# Patient Record
Sex: MALE | Race: WHITE | NOT HISPANIC OR LATINO | Employment: OTHER | URBAN - METROPOLITAN AREA
[De-identification: names, ages, dates, MRNs, and addresses within clinical notes are randomized per-mention and may not be internally consistent; named-entity substitution may affect disease eponyms.]

---

## 2023-03-23 DIAGNOSIS — E53.8 VITAMIN B12 DEFICIENCY: ICD-10-CM

## 2023-03-23 RX ORDER — LANOLIN ALCOHOL/MO/W.PET/CERES
2 CREAM (GRAM) TOPICAL DAILY
COMMUNITY
Start: 2022-12-15 | End: 2023-03-23 | Stop reason: SDUPTHER

## 2023-03-23 RX ORDER — LANOLIN ALCOHOL/MO/W.PET/CERES
2000 CREAM (GRAM) TOPICAL DAILY
Qty: 180 TABLET | Refills: 0 | Status: SHIPPED | OUTPATIENT
Start: 2023-03-23 | End: 2023-06-23

## 2023-04-20 DIAGNOSIS — K21.9 GASTROESOPHAGEAL REFLUX DISEASE WITHOUT ESOPHAGITIS: Primary | ICD-10-CM

## 2023-04-20 RX ORDER — OMEPRAZOLE 40 MG/1
40 CAPSULE, DELAYED RELEASE ORAL 2 TIMES DAILY
COMMUNITY
End: 2023-04-20 | Stop reason: SDUPTHER

## 2023-04-20 RX ORDER — OMEPRAZOLE 40 MG/1
40 CAPSULE, DELAYED RELEASE ORAL 2 TIMES DAILY
Qty: 60 CAPSULE | Refills: 2 | Status: SHIPPED | OUTPATIENT
Start: 2023-04-20 | End: 2023-05-16

## 2023-05-14 DIAGNOSIS — K21.9 GASTROESOPHAGEAL REFLUX DISEASE WITHOUT ESOPHAGITIS: ICD-10-CM

## 2023-05-16 RX ORDER — OMEPRAZOLE 40 MG/1
40 CAPSULE, DELAYED RELEASE ORAL 2 TIMES DAILY
Qty: 60 CAPSULE | Refills: 2 | Status: SHIPPED | OUTPATIENT
Start: 2023-05-16 | End: 2023-08-22 | Stop reason: SDUPTHER

## 2023-06-05 ENCOUNTER — HOSPITAL ENCOUNTER (OUTPATIENT)
Dept: DATA CONVERSION | Facility: HOSPITAL | Age: 73
End: 2023-06-05
Attending: INTERNAL MEDICINE
Payer: MEDICARE

## 2023-06-05 DIAGNOSIS — R94.31 ABNORMAL ELECTROCARDIOGRAM (ECG) (EKG): ICD-10-CM

## 2023-06-05 DIAGNOSIS — I83.90 ASYMPTOMATIC VARICOSE VEINS OF UNSPECIFIED LOWER EXTREMITY: ICD-10-CM

## 2023-06-05 DIAGNOSIS — K50.90 CROHN'S DISEASE, UNSPECIFIED, WITHOUT COMPLICATIONS (MULTI): ICD-10-CM

## 2023-06-05 DIAGNOSIS — E78.5 HYPERLIPIDEMIA, UNSPECIFIED: ICD-10-CM

## 2023-06-05 DIAGNOSIS — Z85.820 PERSONAL HISTORY OF MALIGNANT MELANOMA OF SKIN: ICD-10-CM

## 2023-06-05 DIAGNOSIS — Z86.718 PERSONAL HISTORY OF OTHER VENOUS THROMBOSIS AND EMBOLISM: ICD-10-CM

## 2023-06-05 DIAGNOSIS — K21.9 GASTRO-ESOPHAGEAL REFLUX DISEASE WITHOUT ESOPHAGITIS: ICD-10-CM

## 2023-06-05 DIAGNOSIS — N18.9 CHRONIC KIDNEY DISEASE, UNSPECIFIED: ICD-10-CM

## 2023-06-05 DIAGNOSIS — F17.290 NICOTINE DEPENDENCE, OTHER TOBACCO PRODUCT, UNCOMPLICATED: ICD-10-CM

## 2023-06-05 DIAGNOSIS — Z85.828 PERSONAL HISTORY OF OTHER MALIGNANT NEOPLASM OF SKIN: ICD-10-CM

## 2023-06-05 DIAGNOSIS — K63.5 POLYP OF COLON: ICD-10-CM

## 2023-06-05 DIAGNOSIS — Z85.038 PERSONAL HISTORY OF OTHER MALIGNANT NEOPLASM OF LARGE INTESTINE: ICD-10-CM

## 2023-06-05 DIAGNOSIS — Z86.711 PERSONAL HISTORY OF PULMONARY EMBOLISM: ICD-10-CM

## 2023-06-05 DIAGNOSIS — E21.3 HYPERPARATHYROIDISM, UNSPECIFIED (MULTI): ICD-10-CM

## 2023-06-05 DIAGNOSIS — K50.10 CROHN'S DISEASE OF LARGE INTESTINE WITHOUT COMPLICATIONS (MULTI): ICD-10-CM

## 2023-06-05 DIAGNOSIS — K91.858: ICD-10-CM

## 2023-06-05 DIAGNOSIS — I12.9 HYPERTENSIVE CHRONIC KIDNEY DISEASE WITH STAGE 1 THROUGH STAGE 4 CHRONIC KIDNEY DISEASE, OR UNSPECIFIED CHRONIC KIDNEY DISEASE: ICD-10-CM

## 2023-06-09 LAB
COMPLETE PATHOLOGY REPORT: NORMAL
CONVERTED CLINICAL DIAGNOSIS-HISTORY: NORMAL
CONVERTED FINAL DIAGNOSIS: NORMAL
CONVERTED FINAL REPORT PDF LINK TO COPY AND PASTE: NORMAL
CONVERTED GROSS DESCRIPTION: NORMAL

## 2023-06-22 DIAGNOSIS — E53.8 VITAMIN B12 DEFICIENCY: ICD-10-CM

## 2023-06-23 RX ORDER — ZINC GLUCONATE 50 MG
TABLET ORAL
Qty: 180 TABLET | Refills: 0 | Status: SHIPPED | OUTPATIENT
Start: 2023-06-23 | End: 2024-06-04 | Stop reason: WASHOUT

## 2023-08-03 ENCOUNTER — APPOINTMENT (OUTPATIENT)
Dept: PRIMARY CARE | Facility: CLINIC | Age: 73
End: 2023-08-03
Payer: MEDICARE

## 2023-08-22 DIAGNOSIS — K21.9 GASTROESOPHAGEAL REFLUX DISEASE WITHOUT ESOPHAGITIS: ICD-10-CM

## 2023-08-22 RX ORDER — OMEPRAZOLE 40 MG/1
40 CAPSULE, DELAYED RELEASE ORAL 2 TIMES DAILY
Qty: 180 CAPSULE | Refills: 3 | Status: SHIPPED | OUTPATIENT
Start: 2023-08-22 | End: 2024-02-29 | Stop reason: SDUPTHER

## 2023-08-22 NOTE — TELEPHONE ENCOUNTER
Rx Refill Request Telephone Encounter    Name:  Asaf Wagner  :  [id number]  Medication Name:  omeprazole 40 mg DR capsule  Specific Pharmacy location:    [facility]/pharmacy #[id number] [place], [place] - [address] [place] [id number]  Phone: [phone] Fax: [phone]  Date of last appointment:  2023  Date of next appointment:   None scheduled  Best number to reach patient:

## 2023-10-06 ENCOUNTER — SPECIALTY PHARMACY (OUTPATIENT)
Dept: PHARMACY | Facility: CLINIC | Age: 73
End: 2023-10-06

## 2023-10-06 ENCOUNTER — PHARMACY VISIT (OUTPATIENT)
Dept: PHARMACY | Facility: CLINIC | Age: 73
End: 2023-10-06
Payer: COMMERCIAL

## 2023-10-06 PROCEDURE — RXMED WILLOW AMBULATORY MEDICATION CHARGE

## 2023-10-10 NOTE — PROGRESS NOTES
[facility] Clinical Note    Asaf Wagner is a 73 y.o. male, who is on the specialty pharmacy service for management of: Gastroenterology Core with status of: (Enrolled)     Asaf was contacted on 10/10/2023.    Refer to the encounter summary report for documentation details about patient counseling and education.      Medication Adherence  The importance of adherence was discussed with the patient and they were advised to take the medication as prescribed by their provider. Asaf was encouraged to call his physician's office if they have a question regarding a missed dose.        Patient advised to contact the pharmacy if there are any changes to her medication list, including prescriptions, OTC medications, herbal products, or supplements. Patient was advised of [facility]’s dispensing process, refill timeline, contact information ([phone]), and patient management follow up. Patient confirmed understanding of education conducted during assessment. All patient questions and concerns were addressed to the best of my ability. Patient was encouraged to contact the specialty pharmacy with any questions or concerns.    Confirmed follow-up outreaches are properly scheduled. Reviewed goals of therapy in the program targets.    Nir Dennison, CarlosD

## 2023-11-03 ENCOUNTER — PHARMACY VISIT (OUTPATIENT)
Dept: PHARMACY | Facility: CLINIC | Age: 73
End: 2023-11-03
Payer: COMMERCIAL

## 2023-11-03 ENCOUNTER — SPECIALTY PHARMACY (OUTPATIENT)
Dept: PHARMACY | Facility: CLINIC | Age: 73
End: 2023-11-03

## 2023-11-03 PROCEDURE — RXMED WILLOW AMBULATORY MEDICATION CHARGE

## 2023-11-30 ENCOUNTER — SPECIALTY PHARMACY (OUTPATIENT)
Dept: PHARMACY | Facility: CLINIC | Age: 73
End: 2023-11-30

## 2023-11-30 ENCOUNTER — PHARMACY VISIT (OUTPATIENT)
Dept: PHARMACY | Facility: CLINIC | Age: 73
End: 2023-11-30
Payer: COMMERCIAL

## 2023-11-30 PROCEDURE — RXMED WILLOW AMBULATORY MEDICATION CHARGE

## 2023-12-22 ENCOUNTER — SPECIALTY PHARMACY (OUTPATIENT)
Dept: PHARMACY | Facility: CLINIC | Age: 73
End: 2023-12-22

## 2023-12-29 PROCEDURE — RXMED WILLOW AMBULATORY MEDICATION CHARGE

## 2024-01-02 ENCOUNTER — PHARMACY VISIT (OUTPATIENT)
Dept: PHARMACY | Facility: CLINIC | Age: 74
End: 2024-01-02
Payer: COMMERCIAL

## 2024-01-24 ENCOUNTER — SPECIALTY PHARMACY (OUTPATIENT)
Dept: PHARMACY | Facility: CLINIC | Age: 74
End: 2024-01-24

## 2024-01-24 PROCEDURE — RXMED WILLOW AMBULATORY MEDICATION CHARGE

## 2024-01-25 ENCOUNTER — PHARMACY VISIT (OUTPATIENT)
Dept: PHARMACY | Facility: CLINIC | Age: 74
End: 2024-01-25
Payer: COMMERCIAL

## 2024-01-30 ENCOUNTER — OFFICE VISIT (OUTPATIENT)
Dept: PRIMARY CARE | Facility: CLINIC | Age: 74
End: 2024-01-30
Payer: MEDICARE

## 2024-01-30 VITALS
DIASTOLIC BLOOD PRESSURE: 77 MMHG | HEART RATE: 101 BPM | SYSTOLIC BLOOD PRESSURE: 162 MMHG | WEIGHT: 194.1 LBS | HEIGHT: 68 IN | BODY MASS INDEX: 29.42 KG/M2

## 2024-01-30 DIAGNOSIS — I10 PRIMARY HYPERTENSION: ICD-10-CM

## 2024-01-30 DIAGNOSIS — E55.9 VITAMIN D INSUFFICIENCY: ICD-10-CM

## 2024-01-30 DIAGNOSIS — Z00.00 HEALTHCARE MAINTENANCE: Primary | ICD-10-CM

## 2024-01-30 DIAGNOSIS — R12 HEARTBURN: ICD-10-CM

## 2024-01-30 PROBLEM — U07.1 DISEASE DUE TO SEVERE ACUTE RESPIRATORY SYNDROME CORONAVIRUS 2 (SARS-COV-2): Status: ACTIVE | Noted: 2024-01-30

## 2024-01-30 PROBLEM — I26.99 PULMONARY EMBOLISM (MULTI): Status: ACTIVE | Noted: 2024-01-30

## 2024-01-30 PROBLEM — K91.30 SMALL BOWEL ANASTOMOTIC STRICTURE: Status: ACTIVE | Noted: 2024-01-30

## 2024-01-30 PROBLEM — T50.905A DRUG-INDUCED HEPATITIS: Status: ACTIVE | Noted: 2024-01-30

## 2024-01-30 PROBLEM — E04.1 THYROID NODULE: Status: ACTIVE | Noted: 2024-01-30

## 2024-01-30 PROBLEM — K71.6 DRUG-INDUCED HEPATITIS: Status: ACTIVE | Noted: 2024-01-30

## 2024-01-30 PROBLEM — D18.03 HEMANGIOMA OF LIVER: Status: ACTIVE | Noted: 2024-01-30

## 2024-01-30 PROBLEM — M81.0 OSTEOPOROSIS: Status: ACTIVE | Noted: 2024-01-30

## 2024-01-30 PROBLEM — N28.1 BILATERAL RENAL CYSTS: Status: ACTIVE | Noted: 2024-01-30

## 2024-01-30 PROBLEM — K20.90 ESOPHAGITIS: Status: ACTIVE | Noted: 2024-01-30

## 2024-01-30 PROBLEM — R13.10 DYSPHAGIA: Status: ACTIVE | Noted: 2024-01-30

## 2024-01-30 PROBLEM — D64.9 ANEMIA: Status: ACTIVE | Noted: 2024-01-30

## 2024-01-30 PROBLEM — E78.5 DYSLIPIDEMIA: Status: ACTIVE | Noted: 2024-01-30

## 2024-01-30 PROBLEM — K91.89 SMALL BOWEL ANASTOMOTIC STRICTURE: Status: ACTIVE | Noted: 2024-01-30

## 2024-01-30 PROBLEM — R94.31 ABNORMAL EKG: Status: ACTIVE | Noted: 2024-01-30

## 2024-01-30 PROBLEM — Z90.49 HISTORY OF RIGHT HEMICOLECTOMY: Status: ACTIVE | Noted: 2024-01-30

## 2024-01-30 PROBLEM — K50.80 CROHN'S DISEASE OF BOTH SMALL AND LARGE INTESTINE WITHOUT COMPLICATION (MULTI): Status: ACTIVE | Noted: 2024-01-30

## 2024-01-30 PROBLEM — K80.20 GALL STONES: Status: ACTIVE | Noted: 2024-01-30

## 2024-01-30 PROBLEM — R19.03 ABDOMINAL MASS, RLQ (RIGHT LOWER QUADRANT): Status: ACTIVE | Noted: 2024-01-30

## 2024-01-30 PROBLEM — K50.90 CROHN'S DISEASE (MULTI): Status: ACTIVE | Noted: 2024-01-30

## 2024-01-30 PROBLEM — C43.9 MELANOMA (MULTI): Status: ACTIVE | Noted: 2024-01-30

## 2024-01-30 PROBLEM — E53.8 VITAMIN B12 DEFICIENCY: Status: ACTIVE | Noted: 2024-01-30

## 2024-01-30 PROBLEM — N18.9 CHRONIC KIDNEY DISEASE: Status: ACTIVE | Noted: 2024-01-30

## 2024-01-30 PROBLEM — N25.81 HYPERPARATHYROIDISM, SECONDARY (MULTI): Status: ACTIVE | Noted: 2024-01-30

## 2024-01-30 PROBLEM — K90.89 BILE SALT-INDUCED DIARRHEA (HHS-HCC): Status: ACTIVE | Noted: 2024-01-30

## 2024-01-30 PROBLEM — I87.2 VENOUS INSUFFICIENCY: Status: ACTIVE | Noted: 2024-01-30

## 2024-01-30 PROCEDURE — 1160F RVW MEDS BY RX/DR IN RCRD: CPT | Performed by: INTERNAL MEDICINE

## 2024-01-30 PROCEDURE — 3077F SYST BP >= 140 MM HG: CPT | Performed by: INTERNAL MEDICINE

## 2024-01-30 PROCEDURE — 1126F AMNT PAIN NOTED NONE PRSNT: CPT | Performed by: INTERNAL MEDICINE

## 2024-01-30 PROCEDURE — 3078F DIAST BP <80 MM HG: CPT | Performed by: INTERNAL MEDICINE

## 2024-01-30 PROCEDURE — 99213 OFFICE O/P EST LOW 20 MIN: CPT | Performed by: INTERNAL MEDICINE

## 2024-01-30 PROCEDURE — 1159F MED LIST DOCD IN RCRD: CPT | Performed by: INTERNAL MEDICINE

## 2024-01-30 RX ORDER — SIMETHICONE 125 MG
125 CAPSULE ORAL EVERY 6 HOURS PRN
Qty: 28 CAPSULE | Refills: 0 | Status: SHIPPED | OUTPATIENT
Start: 2024-01-30

## 2024-01-30 RX ORDER — SODIUM PICOSULFATE, MAGNESIUM OXIDE, AND ANHYDROUS CITRIC ACID 10; 3.5; 12 MG/160ML; G/160ML; G/160ML
LIQUID ORAL
COMMUNITY
Start: 2023-04-17 | End: 2024-06-11 | Stop reason: ALTCHOICE

## 2024-01-30 RX ORDER — CHOLESTYRAMINE 4 G/9G
POWDER, FOR SUSPENSION ORAL
COMMUNITY
Start: 2016-05-05

## 2024-01-30 RX ORDER — ASPIRIN 325 MG
TABLET, DELAYED RELEASE (ENTERIC COATED) ORAL
COMMUNITY
Start: 2019-11-20 | End: 2024-01-30 | Stop reason: WASHOUT

## 2024-01-30 RX ORDER — AZATHIOPRINE 50 MG/1
150 TABLET ORAL DAILY
COMMUNITY
End: 2024-02-14

## 2024-01-30 RX ORDER — CLOBETASOL PROPIONATE 0.5 MG/G
OINTMENT TOPICAL
COMMUNITY
Start: 2023-04-18

## 2024-01-30 ASSESSMENT — ENCOUNTER SYMPTOMS
NAUSEA: 0
ABDOMINAL PAIN: 0
COUGH: 0
CONSTIPATION: 0
ROS GI COMMENTS: HEARTBURN
SHORTNESS OF BREATH: 0

## 2024-01-30 NOTE — ASSESSMENT & PLAN NOTE
Pt states BP not normally this elevated.  -Will recheck at home and come back in one month to see how BP is at home. If it remains elevated then will need to discuss starting medication for it.

## 2024-01-30 NOTE — PROGRESS NOTES
"Subjective   Patient ID: Asaf Wagner is a 73 y.o. male who presents for Establish Care.    Here to get established w/ new PCP.    Is concerned about his heartburn. Finds that when he gets stressed or exerts himself that he deals with heartburn. After calming down symptoms resolve. Has been taking omeprazole for at least a year but doesn't feel that it is helping. Stopped taking it a for a few days and feels that symptoms have improved.    BP elevated here even on recheck. Pt says he checks at home and its usually in the 130/80s there. Drinks around 2 cups of coffee a day. Uses an e cigarette but is trying to cut back.        Review of Systems   Respiratory:  Negative for cough and shortness of breath.    Cardiovascular:  Negative for chest pain.   Gastrointestinal:  Negative for abdominal pain, constipation and nausea.        Heartburn       /77   Pulse 101   Ht 1.735 m (5' 8.31\")   Wt 88 kg (194 lb 1.6 oz)   BMI 29.25 kg/m²   Objective   Physical Exam  Constitutional:       General: He is not in acute distress.     Appearance: He is not ill-appearing, toxic-appearing or diaphoretic.   HENT:      Head: Normocephalic and atraumatic.   Cardiovascular:      Rate and Rhythm: Normal rate.      Heart sounds: No murmur heard.     No friction rub. No gallop.   Neurological:      Mental Status: He is alert.         Assessment/Plan   Problem List Items Addressed This Visit             ICD-10-CM    Hypertension I10     Pt states BP not normally this elevated.  -Will recheck at home and come back in one month to see how BP is at home. If it remains elevated then will need to discuss starting medication for it.         Vitamin D insufficiency E55.9    Relevant Orders    Vitamin D 25-Hydroxy,Total (for eval of Vitamin D levels)     Other Visit Diagnoses         Codes    Healthcare maintenance    -  Primary Z00.00    Relevant Orders    Vitamin B12    Basic metabolic panel    Heartburn     R12    Relevant Medications "    simethicone (Mylicon,Gas-X) 125 mg capsule        -To come back in 1 month to check BP and review other pt problems. Understands to get labwork done before that appt.  -Trying simethicone to see if it helps with heartburn. If not, may need to consider other treatment modalities. Pt says omeprazole used to help but no longer does.         Bibi Pena MD 01/30/24 2:51 PM

## 2024-02-11 DIAGNOSIS — K50.90 CROHN'S DISEASE, UNSPECIFIED, WITHOUT COMPLICATIONS (MULTI): ICD-10-CM

## 2024-02-14 RX ORDER — AZATHIOPRINE 50 MG/1
150 TABLET ORAL DAILY
Qty: 90 TABLET | Refills: 3 | Status: SHIPPED | OUTPATIENT
Start: 2024-02-14 | End: 2024-03-20

## 2024-02-19 ENCOUNTER — SPECIALTY PHARMACY (OUTPATIENT)
Dept: PHARMACY | Facility: CLINIC | Age: 74
End: 2024-02-19

## 2024-02-19 PROCEDURE — RXMED WILLOW AMBULATORY MEDICATION CHARGE

## 2024-02-20 ENCOUNTER — PHARMACY VISIT (OUTPATIENT)
Dept: PHARMACY | Facility: CLINIC | Age: 74
End: 2024-02-20
Payer: COMMERCIAL

## 2024-02-24 ENCOUNTER — LAB (OUTPATIENT)
Dept: LAB | Facility: LAB | Age: 74
End: 2024-02-24
Payer: MEDICARE

## 2024-02-24 DIAGNOSIS — Z00.00 HEALTHCARE MAINTENANCE: ICD-10-CM

## 2024-02-24 DIAGNOSIS — E55.9 VITAMIN D INSUFFICIENCY: ICD-10-CM

## 2024-02-24 LAB
25(OH)D3 SERPL-MCNC: 30 NG/ML (ref 30–100)
ANION GAP SERPL CALC-SCNC: 16 MMOL/L (ref 10–20)
BUN SERPL-MCNC: 22 MG/DL (ref 6–23)
CALCIUM SERPL-MCNC: 9.5 MG/DL (ref 8.6–10.3)
CHLORIDE SERPL-SCNC: 106 MMOL/L (ref 98–107)
CO2 SERPL-SCNC: 23 MMOL/L (ref 21–32)
CREAT SERPL-MCNC: 1.71 MG/DL (ref 0.5–1.3)
EGFRCR SERPLBLD CKD-EPI 2021: 42 ML/MIN/1.73M*2
GLUCOSE SERPL-MCNC: 118 MG/DL (ref 74–99)
POTASSIUM SERPL-SCNC: 4.1 MMOL/L (ref 3.5–5.3)
SODIUM SERPL-SCNC: 141 MMOL/L (ref 136–145)
VIT B12 SERPL-MCNC: 320 PG/ML (ref 211–911)

## 2024-02-24 PROCEDURE — 82607 VITAMIN B-12: CPT

## 2024-02-24 PROCEDURE — 36415 COLL VENOUS BLD VENIPUNCTURE: CPT

## 2024-02-24 PROCEDURE — 80048 BASIC METABOLIC PNL TOTAL CA: CPT

## 2024-02-24 PROCEDURE — 82306 VITAMIN D 25 HYDROXY: CPT

## 2024-02-29 ENCOUNTER — OFFICE VISIT (OUTPATIENT)
Dept: PRIMARY CARE | Facility: CLINIC | Age: 74
End: 2024-02-29
Payer: MEDICARE

## 2024-02-29 VITALS
WEIGHT: 196.7 LBS | HEART RATE: 76 BPM | SYSTOLIC BLOOD PRESSURE: 160 MMHG | DIASTOLIC BLOOD PRESSURE: 97 MMHG | BODY MASS INDEX: 29.64 KG/M2

## 2024-02-29 DIAGNOSIS — I10 BENIGN ESSENTIAL HTN: ICD-10-CM

## 2024-02-29 DIAGNOSIS — R07.89 CHEST HEAVINESS: ICD-10-CM

## 2024-02-29 DIAGNOSIS — N52.9 ERECTILE DYSFUNCTION, UNSPECIFIED ERECTILE DYSFUNCTION TYPE: ICD-10-CM

## 2024-02-29 DIAGNOSIS — L53.9 LEG ERYTHEMA: ICD-10-CM

## 2024-02-29 DIAGNOSIS — N18.31 STAGE 3A CHRONIC KIDNEY DISEASE (MULTI): ICD-10-CM

## 2024-02-29 DIAGNOSIS — K21.9 GASTROESOPHAGEAL REFLUX DISEASE WITHOUT ESOPHAGITIS: Primary | ICD-10-CM

## 2024-02-29 PROCEDURE — 1160F RVW MEDS BY RX/DR IN RCRD: CPT | Performed by: INTERNAL MEDICINE

## 2024-02-29 PROCEDURE — 3077F SYST BP >= 140 MM HG: CPT | Performed by: INTERNAL MEDICINE

## 2024-02-29 PROCEDURE — 3080F DIAST BP >= 90 MM HG: CPT | Performed by: INTERNAL MEDICINE

## 2024-02-29 PROCEDURE — 1159F MED LIST DOCD IN RCRD: CPT | Performed by: INTERNAL MEDICINE

## 2024-02-29 PROCEDURE — 1126F AMNT PAIN NOTED NONE PRSNT: CPT | Performed by: INTERNAL MEDICINE

## 2024-02-29 PROCEDURE — 99213 OFFICE O/P EST LOW 20 MIN: CPT | Performed by: INTERNAL MEDICINE

## 2024-02-29 RX ORDER — TADALAFIL 5 MG/1
5 TABLET ORAL DAILY PRN
Qty: 10 TABLET | Refills: 1 | Status: SHIPPED | OUTPATIENT
Start: 2024-02-29 | End: 2024-06-04 | Stop reason: WASHOUT

## 2024-02-29 RX ORDER — AMLODIPINE BESYLATE 5 MG/1
5 TABLET ORAL DAILY
Qty: 30 TABLET | Refills: 5 | Status: SHIPPED | OUTPATIENT
Start: 2024-02-29 | End: 2024-08-27

## 2024-02-29 RX ORDER — OMEPRAZOLE 40 MG/1
40 CAPSULE, DELAYED RELEASE ORAL
Qty: 30 CAPSULE | Refills: 2 | Status: SHIPPED | OUTPATIENT
Start: 2024-02-29 | End: 2024-06-04 | Stop reason: WASHOUT

## 2024-02-29 ASSESSMENT — ENCOUNTER SYMPTOMS
NAUSEA: 1
SHORTNESS OF BREATH: 1
CONSTIPATION: 0
VOMITING: 0
ABDOMINAL PAIN: 1

## 2024-02-29 ASSESSMENT — PATIENT HEALTH QUESTIONNAIRE - PHQ9
SUM OF ALL RESPONSES TO PHQ9 QUESTIONS 1 AND 2: 0
1. LITTLE INTEREST OR PLEASURE IN DOING THINGS: NOT AT ALL
2. FEELING DOWN, DEPRESSED OR HOPELESS: NOT AT ALL

## 2024-02-29 NOTE — ASSESSMENT & PLAN NOTE
Confirmed today based off last appt and home BP readings.  -Given CKD will start amlodipine 5mg daily. Pt cautioned on possible side effects and symptoms of hypotension. Will let us know if that occurs.  -Pt will continue to check BP at home.

## 2024-02-29 NOTE — ASSESSMENT & PLAN NOTE
L > R. Per pt has been there since he was started on Humira (over a year ago). Once was prescribed a steroid cream by dermatology and it helped. Otherwise has been stable. Cautioned on symptoms that would suggest infection. Pt to bring this up to GI next to he sees them.

## 2024-02-29 NOTE — PROGRESS NOTES
Subjective   Patient ID: Asaf Wagner is a 73 y.o. male who presents for Follow-up.    Here for follow up.    Continues to have lower chest/upper abd discomfort. Started months ago and hasn't changed in severity. Is present frequently but not every day. Comes on from sometimes from exertion and sometimes from eating. Notices it more when walking but not when he takes the stairs (thinks its because his wife walks fast which pushes him, as opposed to him taking his time up the stairs). Will occasionally occur while eating as well. Only relief he gets is from burping or resting after symptoms occur. Gets SOB when it comes on. Sensation is non-radiating. Tried simethicone w/o relief. Has also noticed that when he is hunched in front of a computer or when he vapes the sensation can return.    Home BP readings brought in:  2/23: 153/90  2/26: 135/78  2/27: 134/80, 141/78  2/28: 164/92, 130/81, 140/82, 139/89  2/29: 162/89, 142/83        Review of Systems   Respiratory:  Positive for shortness of breath.    Cardiovascular:  Positive for chest pain.   Gastrointestinal:  Positive for abdominal pain and nausea. Negative for constipation and vomiting.       BP (!) 160/97 (BP Location: Right arm, Patient Position: Sitting, BP Cuff Size: Adult)   Pulse 76   Wt 89.2 kg (196 lb 11.2 oz)   BMI 29.64 kg/m²   Objective   Physical Exam  Constitutional:       General: He is not in acute distress.     Appearance: He is not ill-appearing, toxic-appearing or diaphoretic.   HENT:      Head: Normocephalic and atraumatic.   Cardiovascular:      Rate and Rhythm: Normal rate and regular rhythm.      Heart sounds: No murmur heard.     No friction rub. No gallop.   Pulmonary:      Effort: Pulmonary effort is normal. No respiratory distress.      Breath sounds: No stridor. No wheezing, rhonchi or rales.   Abdominal:      General: There is no distension.      Tenderness: There is no abdominal tenderness. There is no guarding.    Musculoskeletal:      Right lower leg: No edema.      Left lower leg: No edema.   Skin:     Findings: Erythema (Lower extremities, L > R) present.   Neurological:      Mental Status: He is alert.         Assessment/Plan   Problem List Items Addressed This Visit             ICD-10-CM    Benign essential HTN I10     Confirmed today based off last appt and home BP readings.  -Given CKD will start amlodipine 5mg daily. Pt cautioned on possible side effects and symptoms of hypotension. Will let us know if that occurs.  -Pt will continue to check BP at home.         Relevant Medications    amLODIPine (Norvasc) 5 mg tablet    Chronic kidney disease N18.9    Relevant Orders    Referral to Nephrology    Chest heaviness R07.89    Relevant Orders    Echocardiogram Stress Test    Leg erythema L53.9     L > R. Per pt has been there since he was started on Humira (over a year ago). Once was prescribed a steroid cream by dermatology and it helped. Otherwise has been stable. Cautioned on symptoms that would suggest infection. Pt to bring this up to GI next to he sees them.          Other Visit Diagnoses         Codes    Gastroesophageal reflux disease without esophagitis    -  Primary K21.9    Relevant Medications    omeprazole (PriLOSEC) 40 mg DR capsule    Erectile dysfunction, unspecified erectile dysfunction type     N52.9    Relevant Medications    tadalafil (Cialis) 5 mg tablet        -Chest heaviness etiology unclear. Concern for cardiac etiology vs heartburn given inconsistent hx. Will get stress test to r/o cardiac cause. In the meantime will start PPI and see if that helps w/ symptoms (pt denies trying a PPI previously despite it being ordered for him in the system). Pt agreeable w/ plan.         Bibi Pena MD 02/29/24 2:42 PM

## 2024-03-11 RX ORDER — DOBUTAMINE HYDROCHLORIDE 100 MG/100ML
5-40 INJECTION INTRAVENOUS CONTINUOUS
Status: CANCELLED | OUTPATIENT
Start: 2024-03-11

## 2024-03-11 RX ORDER — ATROPINE SULFATE 0.1 MG/ML
0.2 INJECTION INTRAVENOUS ONCE AS NEEDED
OUTPATIENT
Start: 2024-03-11

## 2024-03-13 ENCOUNTER — HOSPITAL ENCOUNTER (OUTPATIENT)
Dept: CARDIOLOGY | Facility: HOSPITAL | Age: 74
Discharge: HOME | End: 2024-03-13
Payer: MEDICARE

## 2024-03-13 DIAGNOSIS — R07.89 CHEST HEAVINESS: ICD-10-CM

## 2024-03-13 PROCEDURE — 93016 CV STRESS TEST SUPVJ ONLY: CPT | Performed by: INTERNAL MEDICINE

## 2024-03-13 PROCEDURE — 93018 CV STRESS TEST I&R ONLY: CPT | Performed by: INTERNAL MEDICINE

## 2024-03-13 PROCEDURE — 2500000004 HC RX 250 GENERAL PHARMACY W/ HCPCS (ALT 636 FOR OP/ED): Performed by: INTERNAL MEDICINE

## 2024-03-13 PROCEDURE — 93017 CV STRESS TEST TRACING ONLY: CPT

## 2024-03-13 PROCEDURE — 93350 STRESS TTE ONLY: CPT | Performed by: INTERNAL MEDICINE

## 2024-03-13 RX ORDER — DOBUTAMINE HYDROCHLORIDE 100 MG/100ML
5-40 INJECTION INTRAVENOUS CONTINUOUS
Status: DISCONTINUED | OUTPATIENT
Start: 2024-03-13 | End: 2024-03-14 | Stop reason: HOSPADM

## 2024-03-13 RX ADMIN — DOBUTAMINE HYDROCHLORIDE 20 MCG/KG/MIN: 100 INJECTION INTRAVENOUS at 13:35

## 2024-03-15 ENCOUNTER — OFFICE VISIT (OUTPATIENT)
Dept: NEPHROLOGY | Facility: CLINIC | Age: 74
End: 2024-03-15
Payer: MEDICARE

## 2024-03-15 VITALS
HEART RATE: 76 BPM | BODY MASS INDEX: 28.44 KG/M2 | HEIGHT: 69 IN | SYSTOLIC BLOOD PRESSURE: 134 MMHG | DIASTOLIC BLOOD PRESSURE: 78 MMHG | WEIGHT: 192 LBS

## 2024-03-15 DIAGNOSIS — N18.31 STAGE 3A CHRONIC KIDNEY DISEASE (MULTI): ICD-10-CM

## 2024-03-15 DIAGNOSIS — N20.0 NEPHROLITHIASIS: ICD-10-CM

## 2024-03-15 DIAGNOSIS — I10 ESSENTIAL HYPERTENSION: Primary | ICD-10-CM

## 2024-03-15 PROCEDURE — 1160F RVW MEDS BY RX/DR IN RCRD: CPT | Performed by: INTERNAL MEDICINE

## 2024-03-15 PROCEDURE — 3075F SYST BP GE 130 - 139MM HG: CPT | Performed by: INTERNAL MEDICINE

## 2024-03-15 PROCEDURE — 1159F MED LIST DOCD IN RCRD: CPT | Performed by: INTERNAL MEDICINE

## 2024-03-15 PROCEDURE — 99203 OFFICE O/P NEW LOW 30 MIN: CPT | Performed by: INTERNAL MEDICINE

## 2024-03-15 PROCEDURE — 3078F DIAST BP <80 MM HG: CPT | Performed by: INTERNAL MEDICINE

## 2024-03-15 NOTE — PROGRESS NOTES
Asaf Wagner   73 y.o.      Vitals:    03/15/24 1113   Weight: 87.1 kg (192 lb)      MRN/Room: [id number]/Room/bed info not found      History Of Present Illness  Asaf Wagner is a 73 y.o. male presenting with high Cr level.     Past Medical History  He has a past medical history of Personal history of other endocrine, nutritional and metabolic disease (05/16/2016) and Personal history of other venous thrombosis and embolism.    Surgical History  He has a past surgical history that includes Colectomy (04/21/2016); Lymphadenectomy (05/16/2016); CT angio head w and wo IV contrast (3/28/2021); and CT angio neck (3/28/2021).     Social History  He reports that he quit smoking about 5 years ago. His smoking use included cigarettes. He has a 30.00 pack-year smoking history. He uses smokeless tobacco. He reports that he does not currently use alcohol. He reports that he does not currently use drugs.    Family History  No family history on file.     Allergies  Patient has no known allergies.      Meds:       Current Outpatient Medications   Medication Sig Dispense Refill    adalimumab (Humira Pen) 40 mg/0.4 mL pen injector kit pen-injector INJECT 40MG (1 PEN) UNDER THE SKIN ONCE EVERY OTHER WEEK. 2 each 11    amLODIPine (Norvasc) 5 mg tablet Take 1 tablet (5 mg) by mouth once daily. 30 tablet 5    azaTHIOprine (Imuran) 50 mg tablet TAKE 3 TABLETS BY MOUTH EVERY DAY 90 tablet 3    cholestyramine (Questran) 4 gram packet Take by mouth.      Clenpiq 10 mg-3.5 gram- 12 gram/160 mL solution USE AS DIRECTED BY INSTRUCTIONS - DO NOT ADD WATER.      clobetasol (Temovate) 0.05 % ointment APPLY TO AFFECTED AREAS TWICE DAILY FOR TWO WEEKS      omeprazole (PriLOSEC) 40 mg DR capsule Take 1 capsule (40 mg) by mouth once daily in the morning. Take before meals. 30 capsule 2    simethicone (Mylicon,Gas-X) 125 mg capsule Take 1 capsule (125 mg) by mouth every 6 hours if needed for flatulence. 28 capsule 0    tadalafil (Cialis) 5 mg  tablet Take 1 tablet (5 mg) by mouth once daily as needed for erectile dysfunction. 10 tablet 1    Vitamin B-12 1,000 mcg tablet TAKE 2 TABLETS BY MOUTH ONCE DAILY. 180 tablet 0     No current facility-administered medications for this visit.         ROS:  The patient is awake and oriented. No dizziness or lightheadedness. No chills and no fever. No headaches. No nausea and no vomiting. No shortness of breath. No cough. No sputum. No chest pain. No chest tightness. No abdominal pain. No diarrhea and no constipation. No hematemesis or hemoptysis. No hematuria. No rectal bleeding. No melena. No epistaxis. No urinary symptoms. No flank pain. No leg edema. No leg pain. No weakness. No itching. Overall, the rest of the review of systems is also negative.  12 point review of systems otherwise negative as stated in HPI.        Physical Exam:        Vitals:    03/15/24 1113   BP: 134/78   Pulse: 76     General: The patient is awake, oriented, and is not in any distress.  Head and Neck: Normocephalic. No periorbital edema.  Respiratory: Symmetric air entry. Symmetric chest expansion.No respiratory distress.  Cardiovascular: Symmetric peripheral pulses.  Skin: No maculopapular rash.  Musculoskeletal: No peripheral edema in both left and right upper extremities.  No edema in either left or right lower extremities.  Neuro Exam: Speech is fluent. Moves extremities.        Blood Labs:  No results found for this or any previous visit (from the past 24 hour(s)).   Lab Results   Component Value Date    GLUCOSE 118 (H) 02/24/2024    CALCIUM 9.5 02/24/2024     02/24/2024    K 4.1 02/24/2024    CO2 23 02/24/2024     02/24/2024    BUN 22 02/24/2024    CREATININE 1.71 (H) 02/24/2024       Imaging:  === 09/02/20 ===    - Impression -  1.  Echogenic liver, consistent with fatty infiltration. No focal  liver lesion is seen. The comparison CT is unavailable for review. If  further characterization is needed of liver lesions,  would suggest  MRI.  2. Simple bilateral renal cysts, largest 2 cm right upper kidney.  3. Bilateral nonobstructing nephrolithiasis.  4. Cholelithiasis. No findings to suggest acute cholecystitis.      Assessment and Plan:  #1 chronic kidney disease stage III.  Last creatinine was 1.7.  Review of records shows long history of chronic kidney disease.  He has history of hypertension but no diabetes.  I asked for a kidney ultrasound.  PTH, phosphorus, 25-hydroxy vitamin D, microscopic urinalysis, and a spot urine protein to creatinine ratio will be checked.    2.  Hypertension.  Blood pressure is under control.  Continue the current medications.    Zbigniew Mcclelland MD

## 2024-03-17 DIAGNOSIS — K50.90 CROHN'S DISEASE, UNSPECIFIED, WITHOUT COMPLICATIONS (MULTI): ICD-10-CM

## 2024-03-18 ENCOUNTER — SPECIALTY PHARMACY (OUTPATIENT)
Dept: PHARMACY | Facility: CLINIC | Age: 74
End: 2024-03-18

## 2024-03-18 DIAGNOSIS — K50.80 CROHN'S DISEASE OF BOTH SMALL AND LARGE INTESTINE WITHOUT COMPLICATION (MULTI): Primary | ICD-10-CM

## 2024-03-20 PROCEDURE — RXMED WILLOW AMBULATORY MEDICATION CHARGE

## 2024-03-20 RX ORDER — AZATHIOPRINE 50 MG/1
150 TABLET ORAL DAILY
Qty: 270 TABLET | Refills: 2 | Status: SHIPPED | OUTPATIENT
Start: 2024-03-20

## 2024-03-20 RX ORDER — ADALIMUMAB 40MG/0.4ML
40 KIT SUBCUTANEOUS
Qty: 2 EACH | Refills: 11 | Status: SHIPPED | OUTPATIENT
Start: 2024-03-20 | End: 2024-06-04 | Stop reason: SINTOL

## 2024-03-21 ENCOUNTER — HOSPITAL ENCOUNTER (OUTPATIENT)
Dept: RADIOLOGY | Facility: CLINIC | Age: 74
Discharge: HOME | End: 2024-03-21
Payer: MEDICARE

## 2024-03-21 ENCOUNTER — PHARMACY VISIT (OUTPATIENT)
Dept: PHARMACY | Facility: CLINIC | Age: 74
End: 2024-03-21
Payer: COMMERCIAL

## 2024-03-21 ENCOUNTER — LAB (OUTPATIENT)
Dept: LAB | Facility: LAB | Age: 74
End: 2024-03-21
Payer: MEDICARE

## 2024-03-21 DIAGNOSIS — I10 ESSENTIAL HYPERTENSION: ICD-10-CM

## 2024-03-21 DIAGNOSIS — E21.3 HYPERPARATHYROIDISM (MULTI): Primary | ICD-10-CM

## 2024-03-21 DIAGNOSIS — N18.31 STAGE 3A CHRONIC KIDNEY DISEASE (MULTI): ICD-10-CM

## 2024-03-21 LAB
CREAT UR-MCNC: 114.7 MG/DL (ref 20–370)
MUCOUS THREADS #/AREA URNS AUTO: NORMAL /LPF
PROT UR-ACNC: 14 MG/DL (ref 5–25)
PROT/CREAT UR: 0.12 MG/MG CREAT (ref 0–0.17)
PTH-INTACT SERPL-MCNC: 104.8 PG/ML (ref 18.5–88)
RBC #/AREA URNS AUTO: NORMAL /HPF
WBC #/AREA URNS AUTO: NORMAL /HPF

## 2024-03-21 PROCEDURE — 81001 URINALYSIS AUTO W/SCOPE: CPT

## 2024-03-21 PROCEDURE — 84156 ASSAY OF PROTEIN URINE: CPT

## 2024-03-21 PROCEDURE — 76770 US EXAM ABDO BACK WALL COMP: CPT

## 2024-03-21 PROCEDURE — 76770 US EXAM ABDO BACK WALL COMP: CPT | Performed by: STUDENT IN AN ORGANIZED HEALTH CARE EDUCATION/TRAINING PROGRAM

## 2024-03-21 PROCEDURE — 83970 ASSAY OF PARATHORMONE: CPT

## 2024-03-21 PROCEDURE — 36415 COLL VENOUS BLD VENIPUNCTURE: CPT

## 2024-03-21 PROCEDURE — 82306 VITAMIN D 25 HYDROXY: CPT

## 2024-03-21 PROCEDURE — 82570 ASSAY OF URINE CREATININE: CPT

## 2024-03-22 LAB — 25(OH)D3 SERPL-MCNC: 33 NG/ML (ref 30–100)

## 2024-03-22 RX ORDER — CALCITRIOL 0.25 UG/1
0.25 CAPSULE ORAL EVERY OTHER DAY
Qty: 45 CAPSULE | Refills: 3 | Status: SHIPPED | OUTPATIENT
Start: 2024-03-22 | End: 2024-05-23 | Stop reason: SDUPTHER

## 2024-03-25 ENCOUNTER — TELEPHONE (OUTPATIENT)
Dept: PRIMARY CARE | Facility: CLINIC | Age: 74
End: 2024-03-25

## 2024-03-25 NOTE — TELEPHONE ENCOUNTER
----- Message from Zbigniew Mcclelland MD sent at 3/22/2024  2:58 PM EDT -----  1.  High PTH level.  I put him on calcitriol.  2.  No protein in urine.

## 2024-03-27 NOTE — TELEPHONE ENCOUNTER
----- Message from Zbigniwe Mcclelland MD sent at 3/25/2024 11:30 AM EDT -----  Kidney ultrasound shows multiple bilateral kidney stones.  I put a referral for urology.

## 2024-04-02 ENCOUNTER — OFFICE VISIT (OUTPATIENT)
Dept: NEPHROLOGY | Facility: CLINIC | Age: 74
End: 2024-04-02
Payer: MEDICARE

## 2024-04-02 VITALS
HEART RATE: 97 BPM | HEIGHT: 69 IN | SYSTOLIC BLOOD PRESSURE: 144 MMHG | DIASTOLIC BLOOD PRESSURE: 75 MMHG | WEIGHT: 192 LBS | BODY MASS INDEX: 28.44 KG/M2

## 2024-04-02 DIAGNOSIS — N18.31 STAGE 3A CHRONIC KIDNEY DISEASE (MULTI): Primary | ICD-10-CM

## 2024-04-02 DIAGNOSIS — N20.0 NEPHROLITHIASIS: ICD-10-CM

## 2024-04-02 DIAGNOSIS — E21.3 HYPERPARATHYROIDISM (MULTI): ICD-10-CM

## 2024-04-02 DIAGNOSIS — I10 ESSENTIAL HYPERTENSION: ICD-10-CM

## 2024-04-02 PROCEDURE — 1160F RVW MEDS BY RX/DR IN RCRD: CPT | Performed by: INTERNAL MEDICINE

## 2024-04-02 PROCEDURE — 3078F DIAST BP <80 MM HG: CPT | Performed by: INTERNAL MEDICINE

## 2024-04-02 PROCEDURE — 1159F MED LIST DOCD IN RCRD: CPT | Performed by: INTERNAL MEDICINE

## 2024-04-02 PROCEDURE — 3077F SYST BP >= 140 MM HG: CPT | Performed by: INTERNAL MEDICINE

## 2024-04-02 PROCEDURE — 99214 OFFICE O/P EST MOD 30 MIN: CPT | Performed by: INTERNAL MEDICINE

## 2024-04-02 RX ORDER — HYDROCHLOROTHIAZIDE 25 MG/1
25 TABLET ORAL DAILY
Qty: 90 TABLET | Refills: 3 | Status: SHIPPED | OUTPATIENT
Start: 2024-04-02 | End: 2025-04-02

## 2024-04-02 NOTE — PROGRESS NOTES
Asaf Wagner   73 y.o.    @@  [facility]/Room: [id number]/Room/bed info not found    Subjective:   The patient is being seen for a routine clinic follow-up of chronic kidney disease. Recently, the disease has been stable. Disease complications:  No hyperkalemia, no hypocalcemia, no hyperphosphatemia, no metabolic acidosis, no coagulopathy, no uremic encephalopathy, no neuropathy and no renal osteodystrophy. The patient is currently asymptomatic. No associated symptoms are reported.       Meds:   Current Outpatient Medications   Medication Sig Dispense Refill    adalimumab (Humira,CF, Pen) 40 mg/0.4 mL pen injector kit pen-injector INJECT 40MG (1 PEN) UNDER THE SKIN ONCE EVERY OTHER WEEK. 2 each 11    amLODIPine (Norvasc) 5 mg tablet Take 1 tablet (5 mg) by mouth once daily. 30 tablet 5    azaTHIOprine (Imuran) 50 mg tablet TAKE 3 TABLETS BY MOUTH EVERY  tablet 2    calcitriol (Rocaltrol) 0.25 mcg capsule Take 1 capsule (0.25 mcg) by mouth every other day. 45 capsule 3    cholestyramine (Questran) 4 gram packet Take by mouth.      Clenpiq 10 mg-3.5 gram- 12 gram/160 mL solution USE AS DIRECTED BY INSTRUCTIONS - DO NOT ADD WATER.      clobetasol (Temovate) 0.05 % ointment APPLY TO AFFECTED AREAS TWICE DAILY FOR TWO WEEKS      omeprazole (PriLOSEC) 40 mg DR capsule Take 1 capsule (40 mg) by mouth once daily in the morning. Take before meals. 30 capsule 2    simethicone (Mylicon,Gas-X) 125 mg capsule Take 1 capsule (125 mg) by mouth every 6 hours if needed for flatulence. 28 capsule 0    tadalafil (Cialis) 5 mg tablet Take 1 tablet (5 mg) by mouth once daily as needed for erectile dysfunction. 10 tablet 1    Vitamin B-12 1,000 mcg tablet TAKE 2 TABLETS BY MOUTH ONCE DAILY. 180 tablet 0     No current facility-administered medications for this visit.          ROS:  The patient is awake and oriented. No dizziness or lightheadedness. No chills and no fever. No headaches. No nausea and no vomiting. No shortness of  breath. No cough. No sputum. No chest pain. No chest tightness. No abdominal pain. No diarrhea and no constipation. No hematemesis or hemoptysis. No hematuria. No rectal bleeding. No melena. No epistaxis. No urinary symptoms. No flank pain. No leg edema. No leg pain. No weakness. No itching. Overall, the rest of the review of systems is also negative.  12 point review of systems otherwise negative as stated in HPI.        Physical Examination:        Vitals:    04/02/24 1328   BP: 144/75   Pulse: 97     General: The patient is awake, oriented, and is not in any distress.  Head and Neck: Normocephalic. No periorbital edema.  Eyes: non-icteric  Respiratory: Symmetric air entry. Symmetric chest expansion.No respiratory distress.  Cardiovascular: Symmetric peripheral pulses.  Skin: No maculopapular rash.  Abdomen: soft, nt/nd  Musculoskeletal: No peripheral edema in both left and right upper extremities.  No edema in either left or right lower extremities.  Neuro Exam: Speech is fluent. Moves extremities.    Imaging:  === 03/21/24 ===    US RENAL COMPLETE    - Impression -  Multiple nonobstructing bilateral renal calculi without  hydronephrosis. Multiple bilateral simple renal cysts.    Signed by: Emanuel Bergman 3/22/2024 6:19 PM  Dictation workstation:   [id number]       Blood Labs:  No results found for this or any previous visit (from the past 24 hour(s)).   Lab Results   Component Value Date    .8 (H) 03/21/2024    PROTUR 13 04/12/2021      Lab Results   Component Value Date    GLUCOSE 118 (H) 02/24/2024    CALCIUM 9.5 02/24/2024     02/24/2024    K 4.1 02/24/2024    CO2 23 02/24/2024     02/24/2024    BUN 22 02/24/2024    CREATININE 1.71 (H) 02/24/2024         Assessment and Plan:  #1 chronic kidney disease stage III.  Last creatinine was 1.7.  Kidney ultrasound shows multiple kidney stones.  Normal potassium and bicarb level.  No microscopic hematuria.  No proteinuria.    2.  Hypertension.   Blood pressure is on high side.  I added hydrochlorothiazide to his medications.  This will also help with his nephrolithiasis.    3.  Secondary hyperparathyroidism.  I put him on calcitriol.    4.  Nephrolithiasis.  I ordered 24-hour urine collection for stone panel.  I also referred him to a urologist.    I will see him in about 4 months for follow-up.        Zbigniew Mcclelland MD  Senior Attending Physician  Director of Onco-Nephrology Program  Division of Nephrology & Hypertension  [facility] [place] [facility]

## 2024-04-10 ENCOUNTER — SPECIALTY PHARMACY (OUTPATIENT)
Dept: PHARMACY | Facility: CLINIC | Age: 74
End: 2024-04-10

## 2024-04-10 PROCEDURE — RXMED WILLOW AMBULATORY MEDICATION CHARGE

## 2024-04-10 NOTE — PROGRESS NOTES
[facility] Clinical Note    Asaf Wagner is a 74 y.o. male, who is on the specialty pharmacy service of: Gastroenterology Core.  Asaf Wagner is taking: Humira.     Asaf was contacted on 4/10/2024.    Refer to the encounter summary report for documentation details about patient counseling and education.      Impression/Plan  Is patient high risk? (potential patients:  pregnancy, geriatric, pediatric)   No  Is laboratory follow-up needed? No  Is a clinical intervention needed?  No  Next assessment date?  10/10/24  Additional comments:    Medication Adherence  The importance of adherence was discussed with the patient and they were advised to take the medication as prescribed by their provider. Asaf was encouraged to call his physician's office if they have a question regarding a missed dose.     QOL/Patient Satisfaction  Rate your quality of life on scale of 1-10: 10 - Completely satisfied  Rate your satisfaction with  Specialty Pharmacy on scale of 1-10: 10 - Completely satisfied      Patient advised to contact the pharmacy if there are any changes to her medication list, including prescriptions, OTC medications, herbal products, or supplements. Patient was advised of [facility]’s dispensing process, refill timeline, contact information ([phone]), and patient management follow up. Patient confirmed understanding of education conducted during assessment. All patient questions and concerns were addressed to the best of my ability. Patient was encouraged to contact the specialty pharmacy with any questions or concerns.    Confirmed follow-up outreaches are properly scheduled. Reviewed goals of therapy in the program targets.    Axel Reza, PharmD

## 2024-04-15 ENCOUNTER — PHARMACY VISIT (OUTPATIENT)
Dept: PHARMACY | Facility: CLINIC | Age: 74
End: 2024-04-15
Payer: COMMERCIAL

## 2024-04-23 ENCOUNTER — APPOINTMENT (OUTPATIENT)
Dept: NEPHROLOGY | Facility: CLINIC | Age: 74
End: 2024-04-23
Payer: MEDICARE

## 2024-05-09 ENCOUNTER — SPECIALTY PHARMACY (OUTPATIENT)
Dept: PHARMACY | Facility: CLINIC | Age: 74
End: 2024-05-09

## 2024-05-14 ENCOUNTER — SPECIALTY PHARMACY (OUTPATIENT)
Dept: PHARMACY | Facility: CLINIC | Age: 74
End: 2024-05-14

## 2024-05-14 DIAGNOSIS — K50.80 CROHN'S DISEASE OF BOTH SMALL AND LARGE INTESTINE WITHOUT COMPLICATION (MULTI): Primary | ICD-10-CM

## 2024-05-22 ENCOUNTER — LAB (OUTPATIENT)
Dept: LAB | Facility: LAB | Age: 74
End: 2024-05-22
Payer: MEDICARE

## 2024-05-22 DIAGNOSIS — N18.31 STAGE 3A CHRONIC KIDNEY DISEASE (MULTI): ICD-10-CM

## 2024-05-22 DIAGNOSIS — E21.3 HYPERPARATHYROIDISM (MULTI): ICD-10-CM

## 2024-05-22 DIAGNOSIS — K50.80 CROHN'S DISEASE OF BOTH SMALL AND LARGE INTESTINE WITHOUT COMPLICATION (MULTI): ICD-10-CM

## 2024-05-22 DIAGNOSIS — N20.0 NEPHROLITHIASIS: ICD-10-CM

## 2024-05-22 DIAGNOSIS — I10 ESSENTIAL HYPERTENSION: ICD-10-CM

## 2024-05-22 LAB
ANION GAP SERPL CALC-SCNC: 11 MMOL/L (ref 10–20)
BASOPHILS # BLD AUTO: 0.03 X10*3/UL (ref 0–0.1)
BASOPHILS NFR BLD AUTO: 0.5 %
BUN SERPL-MCNC: 26 MG/DL (ref 6–23)
CALCIUM SERPL-MCNC: 9 MG/DL (ref 8.6–10.3)
CHLORIDE SERPL-SCNC: 105 MMOL/L (ref 98–107)
CO2 SERPL-SCNC: 28 MMOL/L (ref 21–32)
CREAT SERPL-MCNC: 1.85 MG/DL (ref 0.5–1.3)
CRP SERPL-MCNC: 0.26 MG/DL
EGFRCR SERPLBLD CKD-EPI 2021: 38 ML/MIN/1.73M*2
EOSINOPHIL # BLD AUTO: 0.09 X10*3/UL (ref 0–0.4)
EOSINOPHIL NFR BLD AUTO: 1.4 %
ERYTHROCYTE [DISTWIDTH] IN BLOOD BY AUTOMATED COUNT: 16.3 % (ref 11.5–14.5)
ERYTHROCYTE [SEDIMENTATION RATE] IN BLOOD BY WESTERGREN METHOD: 13 MM/H (ref 0–20)
GLUCOSE SERPL-MCNC: 105 MG/DL (ref 74–99)
HCT VFR BLD AUTO: 37 % (ref 41–52)
HGB BLD-MCNC: 11.8 G/DL (ref 13.5–17.5)
IMM GRANULOCYTES # BLD AUTO: 0.03 X10*3/UL (ref 0–0.5)
IMM GRANULOCYTES NFR BLD AUTO: 0.5 % (ref 0–0.9)
LYMPHOCYTES # BLD AUTO: 2.14 X10*3/UL (ref 0.8–3)
LYMPHOCYTES NFR BLD AUTO: 32.9 %
MCH RBC QN AUTO: 29.9 PG (ref 26–34)
MCHC RBC AUTO-ENTMCNC: 31.9 G/DL (ref 32–36)
MCV RBC AUTO: 94 FL (ref 80–100)
MONOCYTES # BLD AUTO: 0.55 X10*3/UL (ref 0.05–0.8)
MONOCYTES NFR BLD AUTO: 8.5 %
NEUTROPHILS # BLD AUTO: 3.66 X10*3/UL (ref 1.6–5.5)
NEUTROPHILS NFR BLD AUTO: 56.2 %
NRBC BLD-RTO: 0 /100 WBCS (ref 0–0)
PLATELET # BLD AUTO: 174 X10*3/UL (ref 150–450)
POTASSIUM SERPL-SCNC: 4.2 MMOL/L (ref 3.5–5.3)
PTH-INTACT SERPL-MCNC: 134.5 PG/ML (ref 18.5–88)
RBC # BLD AUTO: 3.94 X10*6/UL (ref 4.5–5.9)
SODIUM SERPL-SCNC: 140 MMOL/L (ref 136–145)
WBC # BLD AUTO: 6.5 X10*3/UL (ref 4.4–11.3)

## 2024-05-22 PROCEDURE — 36415 COLL VENOUS BLD VENIPUNCTURE: CPT

## 2024-05-22 PROCEDURE — 83970 ASSAY OF PARATHORMONE: CPT

## 2024-05-22 PROCEDURE — 80048 BASIC METABOLIC PNL TOTAL CA: CPT

## 2024-05-22 PROCEDURE — 85025 COMPLETE CBC W/AUTO DIFF WBC: CPT

## 2024-05-22 PROCEDURE — 85652 RBC SED RATE AUTOMATED: CPT

## 2024-05-22 PROCEDURE — 86140 C-REACTIVE PROTEIN: CPT

## 2024-05-23 ENCOUNTER — LAB (OUTPATIENT)
Dept: LAB | Facility: LAB | Age: 74
End: 2024-05-23
Payer: MEDICARE

## 2024-05-23 DIAGNOSIS — I10 ESSENTIAL HYPERTENSION: ICD-10-CM

## 2024-05-23 DIAGNOSIS — N20.0 NEPHROLITHIASIS: Primary | ICD-10-CM

## 2024-05-23 DIAGNOSIS — N20.0 NEPHROLITHIASIS: ICD-10-CM

## 2024-05-23 DIAGNOSIS — E21.3 HYPERPARATHYROIDISM (MULTI): ICD-10-CM

## 2024-05-23 DIAGNOSIS — N28.1 BILATERAL RENAL CYSTS: ICD-10-CM

## 2024-05-23 DIAGNOSIS — R82.992 HYPEROXALURIA: ICD-10-CM

## 2024-05-23 DIAGNOSIS — K50.80 CROHN'S DISEASE OF BOTH SMALL AND LARGE INTESTINE WITHOUT COMPLICATION (MULTI): ICD-10-CM

## 2024-05-23 DIAGNOSIS — N18.31 STAGE 3A CHRONIC KIDNEY DISEASE (MULTI): ICD-10-CM

## 2024-05-23 PROCEDURE — 82436 ASSAY OF URINE CHLORIDE: CPT

## 2024-05-23 PROCEDURE — 84392 ASSAY OF URINE SULFATE: CPT

## 2024-05-23 PROCEDURE — 83735 ASSAY OF MAGNESIUM: CPT

## 2024-05-23 PROCEDURE — 84540 ASSAY OF URINE/UREA-N: CPT

## 2024-05-23 PROCEDURE — 82507 ASSAY OF CITRATE: CPT

## 2024-05-23 PROCEDURE — 82140 ASSAY OF AMMONIA: CPT

## 2024-05-23 PROCEDURE — 82570 ASSAY OF URINE CREATININE: CPT

## 2024-05-23 PROCEDURE — 83986 ASSAY PH BODY FLUID NOS: CPT

## 2024-05-23 PROCEDURE — 84133 ASSAY OF URINE POTASSIUM: CPT

## 2024-05-23 PROCEDURE — 83993 ASSAY FOR CALPROTECTIN FECAL: CPT

## 2024-05-23 PROCEDURE — 84300 ASSAY OF URINE SODIUM: CPT

## 2024-05-23 PROCEDURE — 84560 ASSAY OF URINE/URIC ACID: CPT

## 2024-05-23 PROCEDURE — 83945 ASSAY OF OXALATE: CPT

## 2024-05-23 PROCEDURE — 82340 ASSAY OF CALCIUM IN URINE: CPT

## 2024-05-23 PROCEDURE — 83935 ASSAY OF URINE OSMOLALITY: CPT

## 2024-05-23 RX ORDER — CALCITRIOL 0.25 UG/1
0.25 CAPSULE ORAL DAILY
Qty: 90 CAPSULE | Refills: 3 | Status: SHIPPED | OUTPATIENT
Start: 2024-05-23 | End: 2025-05-23

## 2024-05-23 RX ORDER — SODIUM CHLORIDE 9 MG/ML
1000 INJECTION, SOLUTION INTRAVENOUS ONCE
Status: DISCONTINUED | OUTPATIENT
Start: 2024-05-23 | End: 2024-05-28

## 2024-05-26 LAB — CALPROTECTIN STL-MCNT: 66 UG/G

## 2024-05-28 LAB
AMMONIA 24H UR-SRATE: 26 MMOL/24 H (ref 15–56)
BRUSHITE CRYSTAL(MAYO): -2.8 DG
BSA: ABNORMAL 1.73M(2)
CALCIUM 24H UR-MRATE: 28 MG/24 H
CAOX 24H ENGDIFF UR: 0.76 DG
CHLORIDE 24H UR-SRATE: 144 MMOL/24 H (ref 34–286)
CITRATE 24H UR-MRATE: 132 MG/24 H
COLLECT DURATION TIME UR: 24 H
CREAT 24H UR-MRATE: 2086 MG/24 H (ref 930–2955)
HYDROXYAPATITE 24H ENGDIFF UR: -0.24 DG
MAGNESIUM 24H UR-MRATE: 42 MG/24 H (ref 51–269)
OSMOLALITY 24H UR: 596 MOSM/KG (ref 150–1150)
OXALATE 24H UR-MRATE: 42.2 MG/24 H (ref 9.7–40.5)
OXALATE 24H UR-SRATE: 0.48 MMOL/24 H (ref 0.11–0.46)
PH 24H UR: 5.5 [PH] (ref 4.5–8)
PHOSPHATE 24H UR-MRATE: 1036 MG/24 H (ref 226–1797)
POTASSIUM 24H UR-SRATE: 46 MMOL/24 H (ref 16–105)
PROTEIN CATABOLIC RATE 24H UR-MRATE: 87 G/24 H (ref 56–125)
SODIUM 24H UR-SRATE: 174 MMOL/24 H (ref 22–328)
SPECIMEN VOL 24H UR: 1400 ML
SULFATE 24H UR-SRATE: 12 MMOL/24 H (ref 7–47)
URATE 24H UR-MRATE: 420 MG/24 H (ref 200–1000)
URIC ACID CRYSTAL(MAYO): 2.57 DG
URINALYSIS SPECIALIST REVIEW: ABNORMAL
UUN 24H UR-MRATE: 9.9 G/24 H (ref 7–42)

## 2024-05-28 RX ORDER — SODIUM CHLORIDE 9 MG/ML
1000 INJECTION, SOLUTION INTRAVENOUS ONCE
Status: SHIPPED | OUTPATIENT
Start: 2024-05-28 | End: 2024-05-28

## 2024-05-28 RX ORDER — PYRIDOXINE HCL (VITAMIN B6) 250 MG
250 TABLET ORAL DAILY
Qty: 90 TABLET | Refills: 3 | Status: SHIPPED | OUTPATIENT
Start: 2024-05-28 | End: 2025-05-28

## 2024-05-29 DIAGNOSIS — N18.9 CHRONIC KIDNEY DISEASE, UNSPECIFIED CKD STAGE: Primary | ICD-10-CM

## 2024-05-29 RX ORDER — SODIUM CHLORIDE 9 MG/ML
1000 INJECTION, SOLUTION INTRAVENOUS ONCE
Status: SHIPPED | OUTPATIENT
Start: 2024-05-30

## 2024-05-30 ENCOUNTER — APPOINTMENT (OUTPATIENT)
Dept: PRIMARY CARE | Facility: CLINIC | Age: 74
End: 2024-05-30
Payer: MEDICARE

## 2024-05-30 ENCOUNTER — HOSPITAL ENCOUNTER (OUTPATIENT)
Dept: RADIOLOGY | Facility: HOSPITAL | Age: 74
Discharge: HOME | End: 2024-05-30
Payer: MEDICARE

## 2024-05-30 VITALS
DIASTOLIC BLOOD PRESSURE: 81 MMHG | RESPIRATION RATE: 20 BRPM | OXYGEN SATURATION: 98 % | HEART RATE: 68 BPM | SYSTOLIC BLOOD PRESSURE: 125 MMHG

## 2024-05-30 DIAGNOSIS — K50.80 CROHN'S DISEASE OF BOTH SMALL AND LARGE INTESTINE WITHOUT COMPLICATION (MULTI): ICD-10-CM

## 2024-05-30 PROCEDURE — 74177 CT ABD & PELVIS W/CONTRAST: CPT

## 2024-05-30 PROCEDURE — 2550000001 HC RX 255 CONTRASTS: Performed by: INTERNAL MEDICINE

## 2024-05-30 PROCEDURE — 2500000004 HC RX 250 GENERAL PHARMACY W/ HCPCS (ALT 636 FOR OP/ED): Performed by: INTERNAL MEDICINE

## 2024-05-30 RX ORDER — SODIUM CHLORIDE 9 MG/ML
999 INJECTION, SOLUTION INTRAVENOUS ONCE
Status: COMPLETED | OUTPATIENT
Start: 2024-05-30 | End: 2024-05-30

## 2024-05-30 RX ADMIN — SODIUM CHLORIDE 999 ML/HR: 9 INJECTION, SOLUTION INTRAVENOUS at 14:50

## 2024-05-30 RX ADMIN — IOHEXOL 75 ML: 350 INJECTION, SOLUTION INTRAVENOUS at 16:32

## 2024-05-31 ENCOUNTER — APPOINTMENT (OUTPATIENT)
Dept: UROLOGY | Facility: CLINIC | Age: 74
End: 2024-05-31
Payer: MEDICARE

## 2024-06-04 ENCOUNTER — OFFICE VISIT (OUTPATIENT)
Dept: PRIMARY CARE | Facility: CLINIC | Age: 74
End: 2024-06-04
Payer: MEDICARE

## 2024-06-04 VITALS
HEART RATE: 78 BPM | BODY MASS INDEX: 27.29 KG/M2 | SYSTOLIC BLOOD PRESSURE: 120 MMHG | WEIGHT: 190.6 LBS | HEIGHT: 70 IN | DIASTOLIC BLOOD PRESSURE: 70 MMHG

## 2024-06-04 DIAGNOSIS — Z71.89 GOALS OF CARE, COUNSELING/DISCUSSION: ICD-10-CM

## 2024-06-04 DIAGNOSIS — K50.80 CROHN'S DISEASE OF BOTH SMALL AND LARGE INTESTINE WITHOUT COMPLICATION (MULTI): ICD-10-CM

## 2024-06-04 DIAGNOSIS — I10 PRIMARY HYPERTENSION: ICD-10-CM

## 2024-06-04 DIAGNOSIS — Z00.00 MEDICARE ANNUAL WELLNESS VISIT, SUBSEQUENT: Primary | ICD-10-CM

## 2024-06-04 PROCEDURE — G0439 PPPS, SUBSEQ VISIT: HCPCS | Performed by: INTERNAL MEDICINE

## 2024-06-04 PROCEDURE — 3078F DIAST BP <80 MM HG: CPT | Performed by: INTERNAL MEDICINE

## 2024-06-04 PROCEDURE — 3074F SYST BP LT 130 MM HG: CPT | Performed by: INTERNAL MEDICINE

## 2024-06-04 PROCEDURE — 1158F ADVNC CARE PLAN TLK DOCD: CPT | Performed by: INTERNAL MEDICINE

## 2024-06-04 PROCEDURE — 1159F MED LIST DOCD IN RCRD: CPT | Performed by: INTERNAL MEDICINE

## 2024-06-04 PROCEDURE — 1036F TOBACCO NON-USER: CPT | Performed by: INTERNAL MEDICINE

## 2024-06-04 PROCEDURE — 1160F RVW MEDS BY RX/DR IN RCRD: CPT | Performed by: INTERNAL MEDICINE

## 2024-06-04 PROCEDURE — 1123F ACP DISCUSS/DSCN MKR DOCD: CPT | Performed by: INTERNAL MEDICINE

## 2024-06-04 PROCEDURE — 1170F FXNL STATUS ASSESSED: CPT | Performed by: INTERNAL MEDICINE

## 2024-06-04 ASSESSMENT — ACTIVITIES OF DAILY LIVING (ADL)
BATHING: INDEPENDENT
DRESSING: INDEPENDENT
GROCERY_SHOPPING: TOTAL CARE
TAKING_MEDICATION: INDEPENDENT
MANAGING_FINANCES: INDEPENDENT
DOING_HOUSEWORK: INDEPENDENT

## 2024-06-04 ASSESSMENT — PATIENT HEALTH QUESTIONNAIRE - PHQ9
2. FEELING DOWN, DEPRESSED OR HOPELESS: NOT AT ALL
SUM OF ALL RESPONSES TO PHQ9 QUESTIONS 1 AND 2: 0
1. LITTLE INTEREST OR PLEASURE IN DOING THINGS: NOT AT ALL

## 2024-06-04 ASSESSMENT — ENCOUNTER SYMPTOMS
LOSS OF SENSATION IN FEET: 0
FATIGUE: 1
OCCASIONAL FEELINGS OF UNSTEADINESS: 0
DEPRESSION: 0

## 2024-06-04 NOTE — ASSESSMENT & PLAN NOTE
-Stopped on humira d/t side effects (rash).  -Had recent CT showing retained capsule. Discussed w/ pt; he will be seeing GI next week.

## 2024-06-04 NOTE — PROGRESS NOTES
"Subjective   Reason for Visit: Asaf Wagner is an 74 y.o. male here for a Medicare Wellness visit.     Past Medical, Surgical, and Family History reviewed and updated in chart.    Reviewed all medications by prescribing practitioner or clinical pharmacist (such as prescriptions, OTCs, herbal therapies and supplements) and documented in the medical record.    Overall feels well. Is tired of all the doctors appointments.    Since being seen last, Dr. Christian stopped pt's humira because of his LLE cellulitis. Was told by his dermatologist at [facility] it was likely a side effect from this.        Patient Care Team:  Bibi Pena MD as PCP - General (Internal Medicine)  Zbigniew Mcclelland MD as Consulting Physician (Nephrology)     Review of Systems   Constitutional:  Positive for fatigue.   Skin:  Positive for rash.       Objective   Vitals:  /70 (BP Location: Right arm, Patient Position: Sitting, BP Cuff Size: Adult)   Pulse 78   Ht 1.78 m (5' 10.08\")   Wt 86.5 kg (190 lb 9.6 oz)   BMI 27.29 kg/m²       Physical Exam  Constitutional:       General: He is not in acute distress.     Appearance: He is not ill-appearing, toxic-appearing or diaphoretic.   HENT:      Head: Normocephalic and atraumatic.   Eyes:      Conjunctiva/sclera: Conjunctivae normal.   Cardiovascular:      Rate and Rhythm: Normal rate and regular rhythm.      Heart sounds: No murmur heard.     No friction rub. No gallop.   Pulmonary:      Effort: Pulmonary effort is normal. No respiratory distress.      Breath sounds: No stridor. No wheezing, rhonchi or rales.   Skin:     Findings: Erythema (Distal LEs, mildly improved compared to last visit) present.   Neurological:      Mental Status: He is alert.         Assessment/Plan   Problem List Items Addressed This Visit       Crohn's disease of both small and large intestine without complication (Multi)    Overview     -Stopped on humira d/t side effects (rash).  -Had recent CT showing " retained capsule. Discussed w/ pt; he will be seeing GI next week.         Current Assessment & Plan     -Stopped on humira d/t side effects (rash).  -Had recent CT showing retained capsule. Discussed w/ pt; he will be seeing GI next week.         Hypertension    Overview     Well controlled on amlodipine and hydrochlorothiazide. Has cut back on sodium intake. To continue both medications.         Current Assessment & Plan     Well controlled on amlodipine and hydrochlorothiazide. Has cut back on sodium intake. To continue both medications.          Other Visit Diagnoses       Medicare annual wellness visit, subsequent    -  Primary    Relevant Orders    Hemoglobin A1c    Goals of care, counseling/discussion            -Pt to get A1c when convenient.  -Reviewed that there are other referrals and things to do for healthcare maintenance, but pt currently feels overwhelmed from high number of appointments. Will revisit at next appt.      Advance Directives Discussion  Advanced Care Planning (including a Living Will, Healthcare POA, as well as specific end of life choices and/or directives), was discussed with the patient and/or surrogate, voluntarily, and details of that discussion documented in the Problem List (under Advanced Directives Discussion) of the medical record.  Patient wants to be full code. Has his wife as his HCPOA, with both kids being the alternates. Wants both kids involved in any decision making process if his wife is unavailable.   (~16 min spent discussing above)

## 2024-06-04 NOTE — ASSESSMENT & PLAN NOTE
Well controlled on amlodipine and hydrochlorothiazide. Has cut back on sodium intake. To continue both medications.

## 2024-06-10 NOTE — PROGRESS NOTES
Subjective     History of Present Illness:   Asaf Wagner is a 74 y.o. male who presents to GI clinic for follow-up of Crohns  history of ileocolonic Crohn's disease status post right hemicolectomy many years ago who has been maintained on Imuran for a very long time.   His imaging in December 2019 was consistent with an active inflammation and long segment of the terminal ileum. I therefore did a video capsule endoscopy back in October 2020 which showed significant erosions and aphthous ulcerations with deep ulcerations in the entire distal small bowel. The capsule never reached the cecum and I have called the patient and asked him to do an x-ray but he never did it. Saw him back in February and given a course of budesonide and suggested Humira but he was not interested in Biologics at the time.   He saw me back in August in follow-up and was agreeable to start on Humira. I had recommended Entyvio as a first option because of the history of melanoma but he was worried about the cost and Humira was more cost effective for him. started on Humira for several months (at least 8 months) continues to be on Imuran 150 mg as well as on cholestyramine packs.  He did a follow-up CT enterography and another capsule endoscopy. Again the capsule did not reach the cecum this time as well and showed improvement in the small bowel disease I saw him in follow-up and repeated a colonoscopy which showed a severe stricture at the level of the IC anastomosis that could not be traversed at the time, dilation attempted. also had a small polyp in the transverse colon biopsies showed adenoma-like dysplasia.  When I last saw him back in December I referred him for a follow-up colonoscopy with Dr Mcclendon for surveillance of the dysplasia as well as for stricturoplasty.  He underwent colonoscopy with successful dilation of the stricture.  Biopsies did not show any granulomas or any dysplasia.    Most recent blood work done by me showed CRP  ESR some mild anemia, MR enterography showed stable changes of Crohn's disease with stable chronic strictures and no active inflammation.  He also had changes of chronic portal hypertension.  The capsule endoscopes were seen in the small bwoel .  He was recently been dealing withswelling and redness on the left leg and has been following up with dermatology. 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  When his pathologist sent me this I asked him to stop the Humira and do the workup.    He tells me that he feels fine.  He did not feel any different after the dilation of the stricture.  He really does not have any significant symptoms.  He occasionally will have diarrhea but the diarrhea is controlled by cholestyramine which she takes only as needed.  Continues to take the Imuran every day.  He also was on omeprazole because of grade D esophagitis and he was having dysphagia but he stopped taking it because he was concerned this was causing a problem in his leg.                    Review of Systems  Review of Systems   Constitutional: Negative.  Negative for chills, fever and unexpected weight change.   HENT: Negative.  Negative for trouble swallowing.    Eyes: Negative.    Respiratory: Negative.  Negative for shortness of breath.    Cardiovascular: Negative.  Negative for chest pain.   Gastrointestinal:  Negative for abdominal distention, abdominal pain, anal bleeding, blood in stool, constipation, diarrhea, nausea, rectal pain and vomiting.        As in HPI    Endocrine: Negative.    Genitourinary: Negative.    Musculoskeletal: Negative.    Skin: Negative.  Negative for color change.   Neurological: Negative.    Psychiatric/Behavioral: Negative.         Past Medical History   has a past medical history  of Personal history of other endocrine, nutritional and metabolic disease (05/16/2016) and Personal history of other venous thrombosis and embolism.     Social History   reports that he quit smoking about 5 years ago. His smoking use included cigarettes. He started smoking about 35 years ago. He has a 30 pack-year smoking history. He has never used smokeless tobacco. He reports current alcohol use. He reports that he does not currently use drugs.     Family History  family history is not on file. He was adopted.     Allergies  No Known Allergies    Medications  Current Outpatient Medications   Medication Instructions    amLODIPine (NORVASC) 5 mg, oral, Daily    azaTHIOprine (IMURAN) 150 mg, oral, Daily    calcitriol (ROCALTROL) 0.25 mcg, oral, Daily    cholestyramine (Questran) 4 gram packet oral    Clenpiq 10 mg-3.5 gram- 12 gram/160 mL solution USE AS DIRECTED BY INSTRUCTIONS - DO NOT ADD WATER.    clobetasol (Temovate) 0.05 % ointment APPLY TO AFFECTED AREAS TWICE DAILY FOR TWO WEEKS    hydroCHLOROthiazide (HYDRODIURIL) 25 mg, oral, Daily    pyridoxine (B-6) 250 mg, oral, Daily    simethicone (MYLICON,GAS-X) 125 mg, oral, Every 6 hours PRN       Objective   There were no vitals filed for this visit.  Physical Exam  Vitals reviewed.   Constitutional:       Appearance: Normal appearance.   Cardiovascular:      Rate and Rhythm: Normal rate and regular rhythm.      Pulses: Normal pulses.   Pulmonary:      Effort: Pulmonary effort is normal.      Breath sounds: Normal breath sounds.   Abdominal:      General: Abdomen is flat. Bowel sounds are normal. There is no distension.      Palpations: Abdomen is soft.      Tenderness: There is no abdominal tenderness.   Musculoskeletal:         General: Normal range of motion.   Skin:     Comments: See picture.     Neurological:      Mental Status: He is alert.                 Assessment/Plan   Asaf Wagner is a 74 y.o. male who presents to GI clinic for follow-up of Crohn's  disease.    1.  Chronic stricturing Crohn's disease.  He is status post small bowel resection more than 15 years ago.  This was complicated by multiple recurrent strictures which have been asymptomatic.  He has had 2 capsule endoscopies and both capsules ae stuck in the small bowel  I have had him on Humira for almost a year and a half with some improvement but now because of the recent psoriasis I stopped that Humira and I do not think we should put him back on it.  He continues also to be on Humira and has been on this for a very long time.    At this point he does not have any active disease with a very normal or close to normal inflammatory markers both in the blood in the stool and no evidence of active disease on imaging.  I am not sure we need to do active therapy and I discussed with him at length the different options including watchful waiting given how chronic and stricturing his disease has been his advanced age and the side effects related to the medications.    I will discuss this case in the IBD meeting to try to come up with a consensus of what the next best step to do.    2.  History of reflux esophagitis.  Told him to reduce omeprazole because I think that the it will be hard for him to stop.                    Nimesh Christian MD

## 2024-06-11 ENCOUNTER — OFFICE VISIT (OUTPATIENT)
Dept: GASTROENTEROLOGY | Facility: CLINIC | Age: 74
End: 2024-06-11
Payer: MEDICARE

## 2024-06-11 VITALS
HEART RATE: 97 BPM | HEIGHT: 71 IN | SYSTOLIC BLOOD PRESSURE: 126 MMHG | DIASTOLIC BLOOD PRESSURE: 80 MMHG | BODY MASS INDEX: 26.88 KG/M2 | WEIGHT: 192 LBS

## 2024-06-11 DIAGNOSIS — K50.00 CROHN'S DISEASE OF SMALL INTESTINE WITHOUT COMPLICATION (MULTI): Primary | ICD-10-CM

## 2024-06-11 PROCEDURE — 99214 OFFICE O/P EST MOD 30 MIN: CPT | Performed by: INTERNAL MEDICINE

## 2024-06-11 PROCEDURE — 1160F RVW MEDS BY RX/DR IN RCRD: CPT | Performed by: INTERNAL MEDICINE

## 2024-06-11 PROCEDURE — 3074F SYST BP LT 130 MM HG: CPT | Performed by: INTERNAL MEDICINE

## 2024-06-11 PROCEDURE — 3079F DIAST BP 80-89 MM HG: CPT | Performed by: INTERNAL MEDICINE

## 2024-06-11 PROCEDURE — 1123F ACP DISCUSS/DSCN MKR DOCD: CPT | Performed by: INTERNAL MEDICINE

## 2024-06-11 PROCEDURE — 1159F MED LIST DOCD IN RCRD: CPT | Performed by: INTERNAL MEDICINE

## 2024-06-11 ASSESSMENT — ENCOUNTER SYMPTOMS
DIARRHEA: 0
RESPIRATORY NEGATIVE: 1
EYES NEGATIVE: 1
COLOR CHANGE: 0
FEVER: 0
CARDIOVASCULAR NEGATIVE: 1
ABDOMINAL PAIN: 0
TROUBLE SWALLOWING: 0
VOMITING: 0
PSYCHIATRIC NEGATIVE: 1
BLOOD IN STOOL: 0
RECTAL PAIN: 0
MUSCULOSKELETAL NEGATIVE: 1
ABDOMINAL DISTENTION: 0
UNEXPECTED WEIGHT CHANGE: 0
NEUROLOGICAL NEGATIVE: 1
CONSTITUTIONAL NEGATIVE: 1
CHILLS: 0
SHORTNESS OF BREATH: 0
CONSTIPATION: 0
NAUSEA: 0
ENDOCRINE NEGATIVE: 1
ROS GI COMMENTS: AS IN HPI
ANAL BLEEDING: 0

## 2024-06-29 DIAGNOSIS — I10 BENIGN ESSENTIAL HTN: ICD-10-CM

## 2024-07-01 RX ORDER — AMLODIPINE BESYLATE 5 MG/1
5 TABLET ORAL DAILY
Qty: 90 TABLET | Refills: 1 | Status: SHIPPED | OUTPATIENT
Start: 2024-07-01

## 2024-07-31 ENCOUNTER — APPOINTMENT (OUTPATIENT)
Dept: UROLOGY | Facility: CLINIC | Age: 74
End: 2024-07-31
Payer: MEDICARE

## 2024-07-31 VITALS
DIASTOLIC BLOOD PRESSURE: 71 MMHG | BODY MASS INDEX: 26.88 KG/M2 | HEIGHT: 71 IN | WEIGHT: 192 LBS | TEMPERATURE: 96.5 F | SYSTOLIC BLOOD PRESSURE: 121 MMHG | HEART RATE: 89 BPM

## 2024-07-31 DIAGNOSIS — N20.0 NEPHROLITHIASIS: Primary | ICD-10-CM

## 2024-07-31 LAB
POC APPEARANCE, URINE: CLEAR
POC BILIRUBIN, URINE: NEGATIVE
POC BLOOD, URINE: ABNORMAL
POC COLOR, URINE: YELLOW
POC GLUCOSE, URINE: NEGATIVE MG/DL
POC KETONES, URINE: ABNORMAL MG/DL
POC LEUKOCYTES, URINE: NEGATIVE
POC NITRITE,URINE: NEGATIVE
POC PH, URINE: 6 PH
POC PROTEIN, URINE: NEGATIVE MG/DL
POC SPECIFIC GRAVITY, URINE: 1.02
POC UROBILINOGEN, URINE: 0.2 EU/DL

## 2024-07-31 PROCEDURE — 3078F DIAST BP <80 MM HG: CPT | Performed by: STUDENT IN AN ORGANIZED HEALTH CARE EDUCATION/TRAINING PROGRAM

## 2024-07-31 PROCEDURE — 3074F SYST BP LT 130 MM HG: CPT | Performed by: STUDENT IN AN ORGANIZED HEALTH CARE EDUCATION/TRAINING PROGRAM

## 2024-07-31 PROCEDURE — 99204 OFFICE O/P NEW MOD 45 MIN: CPT | Performed by: STUDENT IN AN ORGANIZED HEALTH CARE EDUCATION/TRAINING PROGRAM

## 2024-07-31 PROCEDURE — 1123F ACP DISCUSS/DSCN MKR DOCD: CPT | Performed by: STUDENT IN AN ORGANIZED HEALTH CARE EDUCATION/TRAINING PROGRAM

## 2024-07-31 PROCEDURE — 81003 URINALYSIS AUTO W/O SCOPE: CPT | Performed by: STUDENT IN AN ORGANIZED HEALTH CARE EDUCATION/TRAINING PROGRAM

## 2024-07-31 PROCEDURE — 1159F MED LIST DOCD IN RCRD: CPT | Performed by: STUDENT IN AN ORGANIZED HEALTH CARE EDUCATION/TRAINING PROGRAM

## 2024-07-31 PROCEDURE — 1036F TOBACCO NON-USER: CPT | Performed by: STUDENT IN AN ORGANIZED HEALTH CARE EDUCATION/TRAINING PROGRAM

## 2024-07-31 PROCEDURE — 3008F BODY MASS INDEX DOCD: CPT | Performed by: STUDENT IN AN ORGANIZED HEALTH CARE EDUCATION/TRAINING PROGRAM

## 2024-07-31 PROCEDURE — 1160F RVW MEDS BY RX/DR IN RCRD: CPT | Performed by: STUDENT IN AN ORGANIZED HEALTH CARE EDUCATION/TRAINING PROGRAM

## 2024-07-31 NOTE — PROGRESS NOTES
Scribed for Dr. George Sanchez by Sher Escobar. I, Dr. George Sanchez have personally reviewed and agreed with the information entered by the Virtual Scribe. 07/31/24.    ASSESSMENT:  Problem List Items Addressed This Visit    None  Visit Diagnoses       Nephrolithiasis    -  Primary    Relevant Orders    POCT UA Automated manually resulted (Completed)    US renal complete           PLAN:  #Nephrolithiasis  Discussed non-surgical vs surgical options. He wants to avoid surgery. He is asymptomatic with numerous renal calcifications and RUP renal stone. He has passed stones in the past without need for intervention.   He elects to continue stone surveillance which is reasonable.   RTC in 1 year with a RBUS prior to.     Patient was educated on stone prevention dietary modifications which include increased water intake, citric acid supplementation in the form of lemon juice, low oxalate diet, moderate protein intake and low sodium intake. In addition, suggested avoiding dark-colored sodas. A daily urine output of 2.5 L is also recommended if feasible.     #Renal cysts  We discussed the natural history of simple renal cysts.  Reassured that these are very common and benign.   I would not recommend further workup or surveillance for this.  Patient reassured.     All questions were answered to the patient’s satisfaction.  Patient agrees with the plan and wishes to proceed.  Continue follow-up for ongoing care of his chronic medical conditions.       History of Present Illness (HPI):  Asaf presents as a new patient for an evaluation.  The patient’s EMR has been reviewed.  Lives in [place], [place].     Hx of nephrolithiasis, CKD, Crohn's disease, secondary hyperparathyroidism, and simple renal cysts.   Followed by Nephrology, who ordered an ultrasound in March 2024 2/2 elevated Cr.   Findings below, noting     RBUS (03/21/24) showed:  Multiple BL non-obstructing renal stones.   Multiple BL simple renal cysts.   No  hydronephrosis BL.     TODAY: (24)  Reports he has been doing well overall.   No acute or worsening complaints.   Denies any stone-related symptoms.   Denies flank pain, or gross hematuria.   Denies any bothersome urinary issues.   Denotes prior hx of passing stones.   Last passed a stone about 7-8 years ago.     PMH: HTN, CKD, GERD, reflux esophagitis, Crohn's disease, melanoma, secondary hyperparathyroidism (on calcitriol)  PSH: R hemicolectomy (remote)  FH: Patient was adopted.   SH: Former smoker (quit about 5 years ago; 30 pack year history)    Past Medical History:   Diagnosis Date    Personal history of other endocrine, nutritional and metabolic disease 2016    History of thyroid nodule    Personal history of other venous thrombosis and embolism     History of DVT of lower extremity     Past Surgical History:   Procedure Laterality Date    COLECTOMY  2016    Partial Colectomy    CT ANGIO NECK  2021    CT NECK ANGIO W AND WO IV CONTRAST 3/28/2021 [facility]    CT HEAD ANGIO W AND WO IV CONTRAST  2021    CT HEAD ANGIO W AND WO IV CONTRAST 3/28/2021 [facility]    LYMPHADENECTOMY  2016    Lymphadenectomy    SKIN CANCER EXCISION       Family History   Adopted: Yes     Social History     Tobacco Use   Smoking Status Former    Current packs/day: 0.00    Average packs/day: 1 pack/day for 30.0 years (30.0 ttl pk-yrs)    Types: Cigarettes    Start date:     Quit date: 2019    Years since quittin.5   Smokeless Tobacco Never     Current Outpatient Medications   Medication Sig Dispense Refill    amLODIPine (Norvasc) 5 mg tablet TAKE 1 TABLET BY MOUTH EVERY DAY 90 tablet 1    azaTHIOprine (Imuran) 50 mg tablet TAKE 3 TABLETS BY MOUTH EVERY  tablet 2    calcitriol (Rocaltrol) 0.25 mcg capsule Take 1 capsule (0.25 mcg) by mouth once daily. 90 capsule 3    cholestyramine (Questran) 4 gram packet Take by mouth.      clobetasol (Temovate) 0.05 % ointment  APPLY TO AFFECTED AREAS TWICE DAILY FOR TWO WEEKS      hydroCHLOROthiazide (HYDRODiuril) 25 mg tablet Take 1 tablet (25 mg) by mouth once daily. 90 tablet 3    pyridoxine (B-6) 250 mg tablet Take 1 tablet (250 mg) by mouth once daily. 90 tablet 3    simethicone (Mylicon,Gas-X) 125 mg capsule Take 1 capsule (125 mg) by mouth every 6 hours if needed for flatulence. 28 capsule 0     Current Facility-Administered Medications   Medication Dose Route Frequency Provider Last Rate Last Admin    sodium chloride 0.9% infusion  1,000 mL/hr intravenous Once Nimesh Christian MD         Allergies   Allergen Reactions    Humira [Adalimumab] Other     Caused med induced eczema     Past medical, surgical, family and social history in the chart was reviewed and is accurate including any additions to what is in this HPI.    REVIEW OF SYSTEMS (ROS):   Constitutional: denies any unintentional weight loss or change in strength.  Integumentary: denies any rashes or pruritus.  Eyes: denies any double vision or eye pain.  Ear/Nose/Mouth/Throat: denies any nosebleeds or gum bleeds.  Cardiovascular: denies any chest pain or syncope.  Respiratory: denies hemoptysis.  Gastrointestinal: denies nausea or vomiting.  Musculoskeletal: denies muscle cramping or weakness.  Neurologic: denies convulsions or seizures.  Hematologic/Lymphatic: denies bleeding tendencies.  Endocrine: denies heat/cold intolerance.  All other systems have been reviewed and are negative unless otherwise noted in the HPI.     OBJECTIVE:  Visit Vitals  /71   Pulse 89   Temp 35.8 °C (96.5 °F)     PHYSICAL EXAM:  Constitutional: No obvious distress.  Eyes: Non-injected conjunctiva, sclera clear, EOMI.  Ears/Nose/Mouth/Throat: No obvious drainage per ears or nose.  Cardiovascular: Extremities are warm and well perfused. No edema, cyanosis or pallor.  Respiratory: No audible wheezing/stridor; respirations do not appear labored.  Gastrointestinal: Abdomen soft, not  distended.  Musculoskeletal: Normal ROM of extremities.  Skin: No obvious rashes or open sores.  Neurologic: Alert and oriented, CN 2-12 grossly intact.  Psychiatric: Answers questions appropriately with normal affect.  Hematologic/Lymphatic/Immunologic: No obvious bruises or sites of spontaneous bleeding.  Genitourinary: No CVA tenderness, bladder not palpable.     LABS & IMAGING:  Lab Results   Component Value Date    WBC 6.5 05/22/2024    HGB 11.8 (L) 05/22/2024    HCT 37.0 (L) 05/22/2024     05/22/2024    CHOL 178 01/12/2023    TRIG 117 01/12/2023    HDL 45.9 01/12/2023    ALT 13 01/12/2023    AST 21 01/12/2023     05/22/2024    K 4.2 05/22/2024     05/22/2024    CREATININE 1.85 (H) 05/22/2024    BUN 26 (H) 05/22/2024    CO2 28 05/22/2024    PSA 0.50 09/11/2020     Scribed for Dr. George Sanchez by Sher Escobar.  I, Dr. George Sanchez have personally reviewed and agreed with the information entered by the Virtual Scribe. 07/31/24.

## 2024-08-06 ENCOUNTER — APPOINTMENT (OUTPATIENT)
Dept: NEPHROLOGY | Facility: CLINIC | Age: 74
End: 2024-08-06
Payer: MEDICARE

## 2024-09-04 ENCOUNTER — APPOINTMENT (OUTPATIENT)
Dept: NEPHROLOGY | Facility: CLINIC | Age: 74
End: 2024-09-04
Payer: MEDICARE

## 2024-09-04 VITALS
HEIGHT: 71 IN | BODY MASS INDEX: 25.9 KG/M2 | WEIGHT: 185 LBS | HEART RATE: 72 BPM | DIASTOLIC BLOOD PRESSURE: 78 MMHG | SYSTOLIC BLOOD PRESSURE: 126 MMHG

## 2024-09-04 DIAGNOSIS — R82.991 HYPOCITRATURIA: ICD-10-CM

## 2024-09-04 DIAGNOSIS — N18.31 STAGE 3A CHRONIC KIDNEY DISEASE (MULTI): Primary | ICD-10-CM

## 2024-09-04 DIAGNOSIS — E21.3 HYPERPARATHYROIDISM (MULTI): ICD-10-CM

## 2024-09-04 DIAGNOSIS — N20.0 NEPHROLITHIASIS: ICD-10-CM

## 2024-09-04 DIAGNOSIS — R82.992 HYPEROXALURIA: ICD-10-CM

## 2024-09-04 DIAGNOSIS — I10 ESSENTIAL HYPERTENSION: ICD-10-CM

## 2024-09-04 PROCEDURE — 3074F SYST BP LT 130 MM HG: CPT | Performed by: INTERNAL MEDICINE

## 2024-09-04 PROCEDURE — 3078F DIAST BP <80 MM HG: CPT | Performed by: INTERNAL MEDICINE

## 2024-09-04 PROCEDURE — 1036F TOBACCO NON-USER: CPT | Performed by: INTERNAL MEDICINE

## 2024-09-04 PROCEDURE — 99214 OFFICE O/P EST MOD 30 MIN: CPT | Performed by: INTERNAL MEDICINE

## 2024-09-04 PROCEDURE — 1159F MED LIST DOCD IN RCRD: CPT | Performed by: INTERNAL MEDICINE

## 2024-09-04 PROCEDURE — 3008F BODY MASS INDEX DOCD: CPT | Performed by: INTERNAL MEDICINE

## 2024-09-04 PROCEDURE — 1123F ACP DISCUSS/DSCN MKR DOCD: CPT | Performed by: INTERNAL MEDICINE

## 2024-09-04 NOTE — PROGRESS NOTES
Asaf Wagner   74 ywil    @@  [facility]/Room: [id number]/Room/bed info not found    Subjective:   The patient is being seen for a routine clinic follow-up of chronic kidney disease. Recently, the disease has been stable. Disease complications:  No hyperkalemia, no hypocalcemia, no hyperphosphatemia, no metabolic acidosis, no coagulopathy, no uremic encephalopathy, no neuropathy and no renal osteodystrophy. The patient is currently asymptomatic. No associated symptoms are reported.       Meds:   Current Outpatient Medications   Medication Sig Dispense Refill    amLODIPine (Norvasc) 5 mg tablet TAKE 1 TABLET BY MOUTH EVERY DAY 90 tablet 1    azaTHIOprine (Imuran) 50 mg tablet TAKE 3 TABLETS BY MOUTH EVERY  tablet 2    calcitriol (Rocaltrol) 0.25 mcg capsule Take 1 capsule (0.25 mcg) by mouth once daily. 90 capsule 3    cholestyramine (Questran) 4 gram packet Take by mouth.      clobetasol (Temovate) 0.05 % ointment APPLY TO AFFECTED AREAS TWICE DAILY FOR TWO WEEKS      hydroCHLOROthiazide (HYDRODiuril) 25 mg tablet Take 1 tablet (25 mg) by mouth once daily. 90 tablet 3    pyridoxine (B-6) 250 mg tablet Take 1 tablet (250 mg) by mouth once daily. 90 tablet 3    simethicone (Mylicon,Gas-X) 125 mg capsule Take 1 capsule (125 mg) by mouth every 6 hours if needed for flatulence. 28 capsule 0     Current Facility-Administered Medications   Medication Dose Route Frequency Provider Last Rate Last Admin    sodium chloride 0.9% infusion  1,000 mL/hr intravenous Once Nimesh Christian MD              ROS:  The patient is awake and oriented. No dizziness or lightheadedness. No chills and no fever. No headaches. No nausea and no vomiting. No shortness of breath. No cough. No sputum. No chest pain. No chest tightness. No abdominal pain. No diarrhea and no constipation. No hematemesis or hemoptysis. No hematuria. No rectal bleeding. No melena. No epistaxis. No urinary symptoms. No flank pain. No leg edema. No leg pain. No weakness.  No itching. Overall, the rest of the review of systems is also negative.  12 point review of systems otherwise negative as stated in HPI.        Physical Examination:        Vitals:    09/04/24 1426   BP: 126/78   Pulse: 72     General: The patient is awake, oriented, and is not in any distress.  Head and Neck: Normocephalic. No periorbital edema.  Eyes: non-icteric  Respiratory: Symmetric air entry. Symmetric chest expansion.No respiratory distress.  Cardiovascular: Symmetric peripheral pulses.  Skin: No maculopapular rash.  Abdomen: soft, nt/nd  Musculoskeletal: No peripheral edema in both left and right upper extremities.  No edema in either left or right lower extremities.  Neuro Exam: Speech is fluent. Moves extremities.    Imaging:  === 03/21/24 ===    US RENAL COMPLETE    - Impression -  Multiple nonobstructing bilateral renal calculi without  hydronephrosis. Multiple bilateral simple renal cysts.    Signed by: Emanuel Bergman 3/22/2024 6:19 PM  Dictation workstation:   [id number]       Blood Labs:  No results found for this or any previous visit (from the past 24 hour(s)).   Lab Results   Component Value Date    .5 (H) 05/22/2024    PROTUR NEGATIVE 07/31/2024      Lab Results   Component Value Date    GLUCOSE 105 (H) 05/22/2024    CALCIUM 9.0 05/22/2024     05/22/2024    K 4.2 05/22/2024    CO2 28 05/22/2024     05/22/2024    BUN 26 (H) 05/22/2024    CREATININE 1.85 (H) 05/22/2024         Assessment and Plan:  #1 chronic kidney disease stage III.  Last creatinine level from May 2024 is 1.8.  I asked for a basic metabolic panel to be done today.     2.  Hypertension.  Blood pressure is under control.     3.  Secondary hyperparathyroidism.  On calcitriol. PTH level will be checked today.     4.  Nephrolithiasis.  24-hour urine collection showed low urine volume.  I talked to him about importance of water intake.  I instructed him to drink enough water to make at least 2 L of urine per  day.  He has hypocitraturia.  I asked him to add lemon juice to his drinking water.  He also has hyperoxaluria for which I put him on vitamin B6.  24-hour urine collection will be repeated.  He is following with urologist.    I will see him in about 4 months for follow-up.          Zbigniew Mcclelland MD  Senior Attending Physician  Director of Onco-Nephrology Program  Division of Nephrology & Hypertension  [facility]

## 2024-10-08 ENCOUNTER — APPOINTMENT (OUTPATIENT)
Dept: PRIMARY CARE | Facility: CLINIC | Age: 74
End: 2024-10-08
Payer: MEDICARE

## 2024-10-22 ENCOUNTER — APPOINTMENT (OUTPATIENT)
Dept: PRIMARY CARE | Facility: CLINIC | Age: 74
End: 2024-10-22
Payer: MEDICARE

## 2024-10-22 VITALS
OXYGEN SATURATION: 100 % | WEIGHT: 186.6 LBS | DIASTOLIC BLOOD PRESSURE: 85 MMHG | RESPIRATION RATE: 16 BRPM | SYSTOLIC BLOOD PRESSURE: 134 MMHG | HEART RATE: 87 BPM | BODY MASS INDEX: 26.03 KG/M2

## 2024-10-22 DIAGNOSIS — Z87.891 QUIT SMOKING: Primary | ICD-10-CM

## 2024-10-22 DIAGNOSIS — Z13.6 SCREENING FOR AAA (ABDOMINAL AORTIC ANEURYSM): ICD-10-CM

## 2024-10-22 PROCEDURE — 3075F SYST BP GE 130 - 139MM HG: CPT | Performed by: INTERNAL MEDICINE

## 2024-10-22 PROCEDURE — 1123F ACP DISCUSS/DSCN MKR DOCD: CPT | Performed by: INTERNAL MEDICINE

## 2024-10-22 PROCEDURE — 3079F DIAST BP 80-89 MM HG: CPT | Performed by: INTERNAL MEDICINE

## 2024-10-22 PROCEDURE — 90662 IIV NO PRSV INCREASED AG IM: CPT | Performed by: INTERNAL MEDICINE

## 2024-10-22 PROCEDURE — 1159F MED LIST DOCD IN RCRD: CPT | Performed by: INTERNAL MEDICINE

## 2024-10-22 PROCEDURE — G0008 ADMIN INFLUENZA VIRUS VAC: HCPCS | Performed by: INTERNAL MEDICINE

## 2024-10-22 PROCEDURE — 99213 OFFICE O/P EST LOW 20 MIN: CPT | Performed by: INTERNAL MEDICINE

## 2024-10-22 ASSESSMENT — PATIENT HEALTH QUESTIONNAIRE - PHQ9
SUM OF ALL RESPONSES TO PHQ9 QUESTIONS 1 AND 2: 0
2. FEELING DOWN, DEPRESSED OR HOPELESS: NOT AT ALL
1. LITTLE INTEREST OR PLEASURE IN DOING THINGS: NOT AT ALL

## 2024-10-22 ASSESSMENT — ENCOUNTER SYMPTOMS
UNEXPECTED WEIGHT CHANGE: 0
SHORTNESS OF BREATH: 0

## 2024-10-22 NOTE — PROGRESS NOTES
Subjective   Patient ID: Asaf Wagner is a 74 y.o. male who presents for Follow-up (6 MONTH ).    Pt's rash since last visit resolved after humira was stopped.    Stopped taking PPI and despite that hasn't experience heartburn.        Review of Systems   Constitutional:  Negative for unexpected weight change.   Respiratory:  Negative for shortness of breath.    Skin:  Negative for rash.       /85 (BP Location: Right arm, Patient Position: Sitting)   Pulse 87   Resp 16   Wt 84.6 kg (186 lb 9.6 oz)   SpO2 100%   BMI 26.03 kg/m²   Objective   Physical Exam  Constitutional:       General: He is not in acute distress.     Appearance: He is not ill-appearing, toxic-appearing or diaphoretic.   HENT:      Head: Normocephalic and atraumatic.   Eyes:      Conjunctiva/sclera: Conjunctivae normal.   Neurological:      Mental Status: He is alert.         Assessment/Plan   Problem List Items Addressed This Visit    None  Visit Diagnoses         Codes    Quit smoking    -  Primary Z87.891    Relevant Orders    CT lung screening low dose    Screening for AAA (abdominal aortic aneurysm)     Z13.6    Relevant Orders    Vascular US Abdominal Aorta Aneurysm AAA Screening        -Pt here for follow up of healthcare maintenance items that he did not have time to attend to earlier this year.  -Is agreeable to CT lung screen and AAA US after reviewing what both tests are indicated for.  -Touched on DEXA scan but pt would like to revisit next year.  -Flu shot today.  -Will see back for MAW in June.    We discussed the purpose of lung cancer screening and the risks and benefits associated with it. Importance of annual screening emphasized. Pt and myself used shared decision making and have decided to proceed with imaging. All questions answered.           Bibi Pena MD 10/22/24 9:07 AM

## 2024-12-06 ENCOUNTER — APPOINTMENT (OUTPATIENT)
Dept: RADIOLOGY | Facility: CLINIC | Age: 74
End: 2024-12-06
Payer: MEDICARE

## 2024-12-20 ENCOUNTER — LAB (OUTPATIENT)
Dept: LAB | Facility: LAB | Age: 74
End: 2024-12-20
Payer: MEDICARE

## 2024-12-20 ENCOUNTER — HOSPITAL ENCOUNTER (OUTPATIENT)
Dept: RADIOLOGY | Facility: CLINIC | Age: 74
Discharge: HOME | End: 2024-12-20
Payer: MEDICARE

## 2024-12-20 ENCOUNTER — HOSPITAL ENCOUNTER (OUTPATIENT)
Dept: VASCULAR MEDICINE | Facility: HOSPITAL | Age: 74
Discharge: HOME | End: 2024-12-20
Payer: MEDICARE

## 2024-12-20 DIAGNOSIS — I10 ESSENTIAL HYPERTENSION: ICD-10-CM

## 2024-12-20 DIAGNOSIS — R82.991 HYPOCITRATURIA: ICD-10-CM

## 2024-12-20 DIAGNOSIS — E21.3 HYPERPARATHYROIDISM (MULTI): ICD-10-CM

## 2024-12-20 DIAGNOSIS — N20.0 NEPHROLITHIASIS: ICD-10-CM

## 2024-12-20 DIAGNOSIS — Z00.00 MEDICARE ANNUAL WELLNESS VISIT, SUBSEQUENT: ICD-10-CM

## 2024-12-20 DIAGNOSIS — Z13.6 SCREENING FOR AAA (ABDOMINAL AORTIC ANEURYSM): ICD-10-CM

## 2024-12-20 DIAGNOSIS — K80.20 CALCULUS OF GALLBLADDER WITHOUT CHOLECYSTITIS WITHOUT OBSTRUCTION: ICD-10-CM

## 2024-12-20 DIAGNOSIS — Z87.891 QUIT SMOKING: ICD-10-CM

## 2024-12-20 DIAGNOSIS — R82.992 HYPEROXALURIA: ICD-10-CM

## 2024-12-20 DIAGNOSIS — N18.31 STAGE 3A CHRONIC KIDNEY DISEASE (MULTI): ICD-10-CM

## 2024-12-20 LAB
ANION GAP SERPL CALC-SCNC: 11 MMOL/L (ref 10–20)
BUN SERPL-MCNC: 23 MG/DL (ref 6–23)
CALCIUM SERPL-MCNC: 9 MG/DL (ref 8.6–10.3)
CHLORIDE SERPL-SCNC: 105 MMOL/L (ref 98–107)
CO2 SERPL-SCNC: 29 MMOL/L (ref 21–32)
CREAT SERPL-MCNC: 1.75 MG/DL (ref 0.5–1.3)
EGFRCR SERPLBLD CKD-EPI 2021: 41 ML/MIN/1.73M*2
EST. AVERAGE GLUCOSE BLD GHB EST-MCNC: 105 MG/DL
GLUCOSE SERPL-MCNC: 113 MG/DL (ref 74–99)
HBA1C MFR BLD: 5.3 %
POTASSIUM SERPL-SCNC: 4 MMOL/L (ref 3.5–5.3)
PTH-INTACT SERPL-MCNC: 110.8 PG/ML (ref 18.5–88)
SODIUM SERPL-SCNC: 141 MMOL/L (ref 136–145)

## 2024-12-20 PROCEDURE — 76706 US ABDL AORTA SCREEN AAA: CPT | Performed by: INTERNAL MEDICINE

## 2024-12-20 PROCEDURE — 83036 HEMOGLOBIN GLYCOSYLATED A1C: CPT

## 2024-12-20 PROCEDURE — 80053 COMPREHEN METABOLIC PANEL: CPT

## 2024-12-20 PROCEDURE — 83970 ASSAY OF PARATHORMONE: CPT

## 2024-12-20 PROCEDURE — 36415 COLL VENOUS BLD VENIPUNCTURE: CPT

## 2024-12-20 PROCEDURE — 82248 BILIRUBIN DIRECT: CPT

## 2024-12-20 PROCEDURE — 71271 CT THORAX LUNG CANCER SCR C-: CPT

## 2024-12-20 PROCEDURE — 76706 US ABDL AORTA SCREEN AAA: CPT

## 2024-12-22 DIAGNOSIS — K80.20 CALCULUS OF GALLBLADDER WITHOUT CHOLECYSTITIS WITHOUT OBSTRUCTION: Primary | ICD-10-CM

## 2024-12-22 LAB
ALBUMIN SERPL BCP-MCNC: 4.3 G/DL (ref 3.4–5)
ALP SERPL-CCNC: 51 U/L (ref 33–136)
ALT SERPL W P-5'-P-CCNC: 24 U/L (ref 10–52)
AST SERPL W P-5'-P-CCNC: 32 U/L (ref 9–39)
BILIRUB DIRECT SERPL-MCNC: 0.1 MG/DL (ref 0–0.3)
BILIRUB SERPL-MCNC: 0.3 MG/DL (ref 0–1.2)
PROT SERPL-MCNC: 7 G/DL (ref 6.4–8.2)

## 2024-12-23 ENCOUNTER — TELEPHONE (OUTPATIENT)
Dept: PRIMARY CARE | Facility: CLINIC | Age: 74
End: 2024-12-23
Payer: MEDICARE

## 2024-12-30 ENCOUNTER — LAB (OUTPATIENT)
Dept: LAB | Facility: LAB | Age: 74
End: 2024-12-30
Payer: MEDICARE

## 2024-12-30 DIAGNOSIS — R82.991 HYPOCITRATURIA: ICD-10-CM

## 2024-12-30 DIAGNOSIS — E21.3 HYPERPARATHYROIDISM (MULTI): ICD-10-CM

## 2024-12-30 DIAGNOSIS — N18.31 STAGE 3A CHRONIC KIDNEY DISEASE (MULTI): ICD-10-CM

## 2024-12-30 DIAGNOSIS — I10 ESSENTIAL HYPERTENSION: ICD-10-CM

## 2024-12-30 DIAGNOSIS — R82.992 HYPEROXALURIA: ICD-10-CM

## 2024-12-30 DIAGNOSIS — N20.0 NEPHROLITHIASIS: ICD-10-CM

## 2024-12-30 PROCEDURE — 84133 ASSAY OF URINE POTASSIUM: CPT

## 2024-12-30 PROCEDURE — 83735 ASSAY OF MAGNESIUM: CPT

## 2024-12-30 PROCEDURE — 82507 ASSAY OF CITRATE: CPT

## 2024-12-30 PROCEDURE — 82436 ASSAY OF URINE CHLORIDE: CPT

## 2024-12-30 PROCEDURE — 84540 ASSAY OF URINE/UREA-N: CPT

## 2024-12-30 PROCEDURE — 83935 ASSAY OF URINE OSMOLALITY: CPT

## 2024-12-30 PROCEDURE — 82340 ASSAY OF CALCIUM IN URINE: CPT

## 2024-12-30 PROCEDURE — 84300 ASSAY OF URINE SODIUM: CPT

## 2024-12-30 PROCEDURE — 82570 ASSAY OF URINE CREATININE: CPT

## 2024-12-30 PROCEDURE — 83986 ASSAY PH BODY FLUID NOS: CPT

## 2024-12-30 PROCEDURE — 84560 ASSAY OF URINE/URIC ACID: CPT

## 2024-12-30 PROCEDURE — 83945 ASSAY OF OXALATE: CPT

## 2024-12-30 PROCEDURE — 84392 ASSAY OF URINE SULFATE: CPT

## 2024-12-30 PROCEDURE — 82140 ASSAY OF AMMONIA: CPT

## 2025-01-05 LAB
AMMONIA 24H UR-SRATE: 33 MMOL/24 H (ref 15–56)
BRUSHITE CRYSTAL(MAYO): -2 DG
BSA: ABNORMAL 1.73M(2)
CALCIUM 24H UR-MRATE: 51 MG/24 H
CAOX 24H ENGDIFF UR: 0.97 DG
CHLORIDE 24H UR-SRATE: 209 MMOL/24 H (ref 34–286)
CITRATE 24H UR-MRATE: 133 MG/24 H
COLLECT DURATION TIME UR: 24 H
CREAT 24H UR-MRATE: 1598 MG/24 H (ref 930–2955)
HYDROXYAPATITE 24H ENGDIFF UR: 1.11 DG
MAGNESIUM 24H UR-MRATE: <34 MG/24 H (ref 51–269)
OSMOLALITY 24H UR: 522 MOSM/KG (ref 150–1150)
OXALATE 24H UR-MRATE: 36.1 MG/24 H (ref 9.7–40.5)
OXALATE 24H UR-SRATE: 0.41 MMOL/24 H (ref 0.11–0.46)
PH 24H UR: 5.7 [PH] (ref 4.5–8)
PHOSPHATE 24H UR-MRATE: 935 MG/24 H (ref 226–1797)
POTASSIUM 24H UR-SRATE: 39 MMOL/24 H (ref 16–105)
PROTEIN CATABOLIC RATE 24H UR-MRATE: 81 G/24 H (ref 56–125)
SODIUM 24H UR-SRATE: 233 MMOL/24 H (ref 22–328)
SPECIMEN VOL 24H UR: 1700 ML
SULFATE 24H UR-SRATE: 11 MMOL/24 H (ref 7–47)
URATE 24H UR-MRATE: 391 MG/24 H (ref 200–1000)
URIC ACID CRYSTAL(MAYO): 1.09 DG
URINALYSIS SPECIALIST REVIEW: ABNORMAL
UUN 24H UR-MRATE: 9 G/24 H (ref 7–42)

## 2025-01-06 ENCOUNTER — TELEPHONE (OUTPATIENT)
Dept: CARDIOLOGY | Facility: CLINIC | Age: 75
End: 2025-01-06
Payer: MEDICARE

## 2025-01-06 NOTE — TELEPHONE ENCOUNTER
----- Message from Zbigniew Mcclelland sent at 1/6/2025  9:51 AM EST -----  He had 1.7 L urine in 24-hour.  It means he is not drinking enough water.  This will put him at high risk for new stone formation.  He needs to drink enough water to make at least 2 to 2.5 L of urine per day.  Hyperoxaluria is improved.  Continue vitamin B6.

## 2025-01-24 ENCOUNTER — TELEPHONE (OUTPATIENT)
Dept: PRIMARY CARE | Facility: CLINIC | Age: 75
End: 2025-01-24
Payer: MEDICARE

## 2025-01-24 DIAGNOSIS — I10 BENIGN ESSENTIAL HTN: Primary | ICD-10-CM

## 2025-01-24 RX ORDER — AMLODIPINE BESYLATE 5 MG/1
5 TABLET ORAL DAILY
Qty: 90 TABLET | Refills: 1 | Status: SHIPPED | OUTPATIENT
Start: 2025-01-24

## 2025-02-25 LAB
NON-UH HIE HGB A1C: 5.5 %
NON-UH HIE VIT D 25: 39 NG/ML

## 2025-03-04 ENCOUNTER — APPOINTMENT (OUTPATIENT)
Dept: NEPHROLOGY | Facility: CLINIC | Age: 75
End: 2025-03-04
Payer: MEDICARE

## 2025-03-04 VITALS
DIASTOLIC BLOOD PRESSURE: 73 MMHG | WEIGHT: 193 LBS | HEART RATE: 90 BPM | SYSTOLIC BLOOD PRESSURE: 127 MMHG | BODY MASS INDEX: 27.02 KG/M2 | HEIGHT: 71 IN

## 2025-03-04 DIAGNOSIS — E21.3 HYPERPARATHYROIDISM (MULTI): ICD-10-CM

## 2025-03-04 DIAGNOSIS — R82.992 HYPEROXALURIA: ICD-10-CM

## 2025-03-04 DIAGNOSIS — N20.0 NEPHROLITHIASIS: ICD-10-CM

## 2025-03-04 DIAGNOSIS — I10 ESSENTIAL HYPERTENSION: Primary | ICD-10-CM

## 2025-03-04 DIAGNOSIS — R82.991 HYPOCITRATURIA: ICD-10-CM

## 2025-03-04 DIAGNOSIS — N18.31 STAGE 3A CHRONIC KIDNEY DISEASE (MULTI): ICD-10-CM

## 2025-03-04 PROCEDURE — 1123F ACP DISCUSS/DSCN MKR DOCD: CPT | Performed by: INTERNAL MEDICINE

## 2025-03-04 PROCEDURE — 1036F TOBACCO NON-USER: CPT | Performed by: INTERNAL MEDICINE

## 2025-03-04 PROCEDURE — 99214 OFFICE O/P EST MOD 30 MIN: CPT | Performed by: INTERNAL MEDICINE

## 2025-03-04 PROCEDURE — 3078F DIAST BP <80 MM HG: CPT | Performed by: INTERNAL MEDICINE

## 2025-03-04 PROCEDURE — 3008F BODY MASS INDEX DOCD: CPT | Performed by: INTERNAL MEDICINE

## 2025-03-04 PROCEDURE — 3074F SYST BP LT 130 MM HG: CPT | Performed by: INTERNAL MEDICINE

## 2025-03-04 RX ORDER — CALCITRIOL 0.25 UG/1
CAPSULE ORAL
Qty: 120 CAPSULE | Refills: 3 | Status: SHIPPED | OUTPATIENT
Start: 2025-03-04

## 2025-03-04 RX ORDER — PYRIDOXINE HCL (VITAMIN B6) 250 MG
250 TABLET ORAL DAILY
Qty: 90 TABLET | Refills: 3 | Status: SHIPPED | OUTPATIENT
Start: 2025-03-04 | End: 2026-03-04

## 2025-03-04 NOTE — PROGRESS NOTES
Asaf Wagner   74 ywil    @@  [facility]/Room: [id number]/Room/bed info not found    Subjective:   The patient is being seen for a routine clinic follow-up of chronic kidney disease. Recently, the disease has been stable. Disease complications:  No hyperkalemia, no hypocalcemia, no hyperphosphatemia, no metabolic acidosis, no coagulopathy, no uremic encephalopathy, no neuropathy and no renal osteodystrophy. The patient is currently asymptomatic. No associated symptoms are reported.       Meds:   Current Outpatient Medications   Medication Sig Dispense Refill    amLODIPine (Norvasc) 5 mg tablet Take 1 tablet (5 mg) by mouth once daily. 90 tablet 1    azaTHIOprine (Imuran) 50 mg tablet TAKE 3 TABLETS BY MOUTH EVERY  tablet 2    calcitriol (Rocaltrol) 0.25 mcg capsule Take 1 capsule (0.25 mcg) by mouth once daily. 90 capsule 3    cholestyramine (Questran) 4 gram packet Take by mouth.      clobetasol (Temovate) 0.05 % ointment APPLY TO AFFECTED AREAS TWICE DAILY FOR TWO WEEKS      hydroCHLOROthiazide (HYDRODiuril) 25 mg tablet Take 1 tablet (25 mg) by mouth once daily. 90 tablet 3     Current Facility-Administered Medications   Medication Dose Route Frequency Provider Last Rate Last Admin    sodium chloride 0.9% infusion  1,000 mL/hr intravenous Once Nimesh Christian MD              ROS:  The patient is awake and oriented. No dizziness or lightheadedness. No chills and no fever. No headaches. No nausea and no vomiting. No shortness of breath. No cough. No chest pain. No abdominal pain. No diarrhea. No hematemesis or hemoptysis. No hematuria. No rectal bleeding. No melena. No epistaxis. No urinary symptoms. No flank pain. No leg edema. No itching. Overall, the rest of the review of systems is also negative.  12 point review of systems otherwise negative as stated in HPI.        Physical Examination:        Vitals:    03/04/25 1336   BP: 127/73   Pulse: 90     General: The patient is awake, oriented, and is not in any  distress.  Head and Neck: Normocephalic. No periorbital edema.  Eyes: non-icteric  Respiratory: Symmetric chest expansion. No respiratory distress.  Skin: No maculopapular rash.  Musculoskeletal: No peripheral edema.  Neuro Exam: Speech is fluent. Moves extremities.    Imaging:  === 03/21/24 ===    US RENAL COMPLETE    - Impression -  Multiple nonobstructing bilateral renal calculi without  hydronephrosis. Multiple bilateral simple renal cysts.    Signed by: Emanuel Bergman 3/22/2024 6:19 PM  Dictation workstation:   [id number]       Blood Labs:  No results found for this or any previous visit (from the past 24 hours).   Lab Results   Component Value Date    .8 (H) 12/20/2024    PROTUR NEGATIVE 07/31/2024      Lab Results   Component Value Date    GLUCOSE 113 (H) 12/20/2024    CALCIUM 9.0 12/20/2024     12/20/2024    K 4.0 12/20/2024    CO2 29 12/20/2024     12/20/2024    BUN 23 12/20/2024    CREATININE 1.75 (H) 12/20/2024         Assessment and Plan:  #1 chronic kidney disease stage III.  Last creatinine level is 1.75.  Normal potassium and bicarb level.    2.  Hypertension.  Blood pressure is under control.  Continue the current medications.     3.  Secondary hyperparathyroidism.  High PTH level.  I increased calcitriol dose.    4.  Nephrolithiasis.  We talked about water intake.  Continue vitamin B6 and hydrochlorothiazide.    I will see him in about 6 months for follow-up.             Zbigniew Mcclelland MD  Senior Attending Physician  Director of Onco-Nephrology Program  Division of Nephrology & Hypertension  [facility], [place]

## 2025-04-12 DIAGNOSIS — N20.0 NEPHROLITHIASIS: ICD-10-CM

## 2025-04-12 DIAGNOSIS — N18.31 STAGE 3A CHRONIC KIDNEY DISEASE (MULTI): ICD-10-CM

## 2025-04-12 DIAGNOSIS — I10 ESSENTIAL HYPERTENSION: ICD-10-CM

## 2025-04-12 DIAGNOSIS — E21.3 HYPERPARATHYROIDISM (MULTI): ICD-10-CM

## 2025-04-15 RX ORDER — HYDROCHLOROTHIAZIDE 25 MG/1
25 TABLET ORAL DAILY
Qty: 90 TABLET | Refills: 3 | Status: SHIPPED | OUTPATIENT
Start: 2025-04-15

## 2025-06-09 ENCOUNTER — APPOINTMENT (OUTPATIENT)
Dept: PRIMARY CARE | Facility: CLINIC | Age: 75
End: 2025-06-09
Payer: MEDICARE

## 2025-06-24 ENCOUNTER — APPOINTMENT (OUTPATIENT)
Dept: PRIMARY CARE | Facility: CLINIC | Age: 75
End: 2025-06-24
Payer: MEDICARE

## 2025-06-24 VITALS
DIASTOLIC BLOOD PRESSURE: 77 MMHG | WEIGHT: 195.4 LBS | SYSTOLIC BLOOD PRESSURE: 139 MMHG | HEART RATE: 79 BPM | RESPIRATION RATE: 12 BRPM | BODY MASS INDEX: 28.94 KG/M2 | HEIGHT: 69 IN | OXYGEN SATURATION: 97 %

## 2025-06-24 DIAGNOSIS — Z00.00 MEDICARE ANNUAL WELLNESS VISIT, SUBSEQUENT: Primary | ICD-10-CM

## 2025-06-24 DIAGNOSIS — N18.31 STAGE 3A CHRONIC KIDNEY DISEASE (MULTI): ICD-10-CM

## 2025-06-24 DIAGNOSIS — Z87.891 QUIT SMOKING: ICD-10-CM

## 2025-06-24 DIAGNOSIS — K50.80 CROHN'S DISEASE OF BOTH SMALL AND LARGE INTESTINE WITHOUT COMPLICATION (MULTI): ICD-10-CM

## 2025-06-24 DIAGNOSIS — Z71.89 GOALS OF CARE, COUNSELING/DISCUSSION: ICD-10-CM

## 2025-06-24 DIAGNOSIS — Z71.6 ENCOUNTER FOR TOBACCO USE CESSATION COUNSELING: ICD-10-CM

## 2025-06-24 PROBLEM — K91.858: Status: ACTIVE | Noted: 2025-06-24

## 2025-06-24 PROCEDURE — G0439 PPPS, SUBSEQ VISIT: HCPCS | Performed by: INTERNAL MEDICINE

## 2025-06-24 PROCEDURE — 3008F BODY MASS INDEX DOCD: CPT | Performed by: INTERNAL MEDICINE

## 2025-06-24 PROCEDURE — 3078F DIAST BP <80 MM HG: CPT | Performed by: INTERNAL MEDICINE

## 2025-06-24 PROCEDURE — 1159F MED LIST DOCD IN RCRD: CPT | Performed by: INTERNAL MEDICINE

## 2025-06-24 PROCEDURE — 1170F FXNL STATUS ASSESSED: CPT | Performed by: INTERNAL MEDICINE

## 2025-06-24 PROCEDURE — 1158F ADVNC CARE PLAN TLK DOCD: CPT | Performed by: INTERNAL MEDICINE

## 2025-06-24 PROCEDURE — 1036F TOBACCO NON-USER: CPT | Performed by: INTERNAL MEDICINE

## 2025-06-24 PROCEDURE — 3075F SYST BP GE 130 - 139MM HG: CPT | Performed by: INTERNAL MEDICINE

## 2025-06-24 RX ORDER — OMEPRAZOLE 40 MG/1
40 CAPSULE, DELAYED RELEASE ORAL AS NEEDED
COMMUNITY

## 2025-06-24 ASSESSMENT — ACTIVITIES OF DAILY LIVING (ADL)
GROCERY_SHOPPING: INDEPENDENT
TAKING_MEDICATION: INDEPENDENT
DRESSING: INDEPENDENT
DOING_HOUSEWORK: INDEPENDENT
MANAGING_FINANCES: INDEPENDENT
BATHING: INDEPENDENT

## 2025-06-24 ASSESSMENT — ENCOUNTER SYMPTOMS
BLOOD IN STOOL: 0
DEPRESSION: 0
SLEEP DISTURBANCE: 0
OCCASIONAL FEELINGS OF UNSTEADINESS: 0
DIZZINESS: 0
LOSS OF SENSATION IN FEET: 0
FATIGUE: 0
SHORTNESS OF BREATH: 0
HEADACHES: 0
CONSTIPATION: 0

## 2025-06-24 NOTE — PROGRESS NOTES
"Subjective   Reason for Visit: Asaf Wagner is an 75 y.o. male here for a Medicare Wellness visit.          Reviewed all medications by prescribing practitioner or clinical pharmacist (such as prescriptions, OTCs, herbal therapies and supplements) and documented in the medical record.    Pt here for MAW.    States he overall is doing well.    Enjoys doing woodworking. Feels like this keeps him physically active. Will go to craft shows on the weekends.    Sleeps on average 5 hours a night. Will nap around 45 mins in the afternoon as well. Says this is a product of him having worked nights in the past.        Patient Care Team:  Bibi Pena MD as PCP - General (Internal Medicine)  Bibi Pena MD as PCP - [facility] Attributed Provider  Zbigniew Mcclelland MD as Nephrologist (Nephrology)  Nimesh Christian MD as Consulting Physician (Gastroenterology)     Pt is not considered to be at high risk of opioid abuse after reviewing chart.    Review of Systems   Constitutional:  Negative for fatigue.   Respiratory:  Negative for shortness of breath.    Cardiovascular:  Negative for chest pain.   Gastrointestinal:  Negative for blood in stool and constipation.   Neurological:  Negative for dizziness and headaches.   Psychiatric/Behavioral:  Negative for sleep disturbance.        Objective   Vitals:  /77 (BP Location: Right arm, Patient Position: Sitting)   Pulse 79   Resp 12   Ht 1.759 m (5' 9.25\")   Wt 88.6 kg (195 lb 6.4 oz)   SpO2 97%   BMI 28.65 kg/m²       Physical Exam  Constitutional:       General: He is not in acute distress.     Appearance: He is not ill-appearing, toxic-appearing or diaphoretic.   HENT:      Head: Normocephalic and atraumatic.   Eyes:      Conjunctiva/sclera: Conjunctivae normal.   Cardiovascular:      Rate and Rhythm: Normal rate and regular rhythm.      Heart sounds: No murmur heard.     No friction rub. No gallop.   Pulmonary:      Effort: Pulmonary effort is normal. No respiratory " distress.      Breath sounds: No stridor. No wheezing, rhonchi or rales.   Abdominal:      General: Abdomen is flat. Bowel sounds are normal. There is no distension.      Palpations: Abdomen is soft.      Tenderness: There is no abdominal tenderness. There is no guarding.   Neurological:      Mental Status: He is alert.         Assessment/Plan   Assessment & Plan  Medicare annual wellness visit, subsequent         Goals of care, counseling/discussion         Stage 3a chronic kidney disease (Multi)  -Sees Dr. Mcclelland. Will have pt get urine albumin before next appt in September. Due for repeat labwork otherwise through him.  Orders:    Albumin-Creatinine Ratio, Urine Random; Future    Quit smoking  -Due again in December. Agreeable with getting it done again.  Orders:    CT lung screening low dose; Future    Encounter for tobacco use cessation counseling  -Stopped vaping in the past and felt like he had more energy. Wants to stop again. Discussed NRT; pt declines this. He feels he can stop on his own. Will follow up next visit.       Crohn's disease of both small and large intestine without complication (Multi)  -Sees Dr. Christian. Taking Imuran. Was on humira, but this caused cellulitis so it was stopped.        -Discussed TDAP and shingles vaccine.    Advance Directives Discussion  Advanced Care Planning (including a Living Will, Healthcare POA, as well as specific end of life choices and/or directives), was discussed with the patient and/or surrogate, voluntarily, and details of that discussion documented in the Problem List (under Advanced Directives Discussion) of the medical record.  Pt states he has a living will and HPCOA. Contacts up to date in chart. Pt confirms he still wants to be full code.   (~16 min spent discussing above)

## 2025-06-24 NOTE — ASSESSMENT & PLAN NOTE
-Sees Dr. Christian. Taking Imuran. Was on humira, but this caused cellulitis so it was stopped.

## 2025-06-24 NOTE — ASSESSMENT & PLAN NOTE
-Sees Dr. Mcclelland. Will have pt get urine albumin before next appt in September. Due for repeat labwork otherwise through him.  Orders:    Albumin-Creatinine Ratio, Urine Random; Future

## 2025-07-13 DIAGNOSIS — I10 BENIGN ESSENTIAL HTN: ICD-10-CM

## 2025-07-14 RX ORDER — AMLODIPINE BESYLATE 5 MG/1
5 TABLET ORAL DAILY
Qty: 90 TABLET | Refills: 1 | Status: SHIPPED | OUTPATIENT
Start: 2025-07-14

## 2025-09-09 ENCOUNTER — APPOINTMENT (OUTPATIENT)
Dept: NEPHROLOGY | Facility: CLINIC | Age: 75
End: 2025-09-09
Payer: MEDICARE

## 2025-10-28 ENCOUNTER — APPOINTMENT (OUTPATIENT)
Dept: NEPHROLOGY | Facility: CLINIC | Age: 75
End: 2025-10-28
Payer: MEDICARE

## 2026-06-24 ENCOUNTER — APPOINTMENT (OUTPATIENT)
Dept: PRIMARY CARE | Facility: CLINIC | Age: 76
End: 2026-06-24
Payer: MEDICARE